# Patient Record
Sex: MALE | Race: BLACK OR AFRICAN AMERICAN | NOT HISPANIC OR LATINO | Employment: OTHER | RURAL
[De-identification: names, ages, dates, MRNs, and addresses within clinical notes are randomized per-mention and may not be internally consistent; named-entity substitution may affect disease eponyms.]

---

## 2021-06-03 ENCOUNTER — HOSPITAL ENCOUNTER (EMERGENCY)
Facility: HOSPITAL | Age: 81
Discharge: HOME | End: 2021-06-03
Attending: STUDENT IN AN ORGANIZED HEALTH CARE EDUCATION/TRAINING PROGRAM
Payer: MEDICARE

## 2021-06-03 VITALS
HEART RATE: 86 BPM | OXYGEN SATURATION: 99 % | DIASTOLIC BLOOD PRESSURE: 90 MMHG | HEIGHT: 70 IN | SYSTOLIC BLOOD PRESSURE: 156 MMHG | TEMPERATURE: 99 F | BODY MASS INDEX: 28.2 KG/M2 | WEIGHT: 197 LBS | RESPIRATION RATE: 16 BRPM

## 2021-06-03 DIAGNOSIS — M25.511 RIGHT SHOULDER PAIN: ICD-10-CM

## 2021-06-03 PROCEDURE — 63600175 PHARM REV CODE 636 W HCPCS: Performed by: STUDENT IN AN ORGANIZED HEALTH CARE EDUCATION/TRAINING PROGRAM

## 2021-06-03 PROCEDURE — 99283 PR EMERGENCY DEPT VISIT,LEVEL III: ICD-10-PCS | Mod: ,,, | Performed by: STUDENT IN AN ORGANIZED HEALTH CARE EDUCATION/TRAINING PROGRAM

## 2021-06-03 PROCEDURE — 99284 EMERGENCY DEPT VISIT MOD MDM: CPT | Mod: 25

## 2021-06-03 PROCEDURE — 96372 THER/PROPH/DIAG INJ SC/IM: CPT

## 2021-06-03 PROCEDURE — 99283 EMERGENCY DEPT VISIT LOW MDM: CPT | Mod: ,,, | Performed by: STUDENT IN AN ORGANIZED HEALTH CARE EDUCATION/TRAINING PROGRAM

## 2021-06-03 RX ORDER — SIMVASTATIN 80 MG/1
TABLET, FILM COATED ORAL
COMMUNITY
Start: 2020-06-10

## 2021-06-03 RX ORDER — KETOROLAC TROMETHAMINE 30 MG/ML
30 INJECTION, SOLUTION INTRAMUSCULAR; INTRAVENOUS
Status: COMPLETED | OUTPATIENT
Start: 2021-06-03 | End: 2021-06-03

## 2021-06-03 RX ORDER — OMEPRAZOLE 20 MG/1
CAPSULE, DELAYED RELEASE ORAL
COMMUNITY
Start: 2020-12-08

## 2021-06-03 RX ORDER — NAPROXEN 500 MG/1
500 TABLET ORAL 2 TIMES DAILY PRN
Qty: 14 TABLET | Refills: 0 | Status: SHIPPED | OUTPATIENT
Start: 2021-06-03

## 2021-06-03 RX ORDER — LOSARTAN POTASSIUM 25 MG/1
TABLET ORAL
COMMUNITY
Start: 2021-03-22

## 2021-06-03 RX ADMIN — KETOROLAC TROMETHAMINE 30 MG: 30 INJECTION, SOLUTION INTRAMUSCULAR; INTRAVENOUS at 06:06

## 2021-09-04 ENCOUNTER — HOSPITAL ENCOUNTER (EMERGENCY)
Facility: HOSPITAL | Age: 81
Discharge: HOME | End: 2021-09-04
Payer: MEDICARE

## 2021-09-04 VITALS
TEMPERATURE: 98 F | OXYGEN SATURATION: 97 % | HEART RATE: 64 BPM | RESPIRATION RATE: 14 BRPM | DIASTOLIC BLOOD PRESSURE: 76 MMHG | WEIGHT: 200 LBS | SYSTOLIC BLOOD PRESSURE: 147 MMHG | BODY MASS INDEX: 28.63 KG/M2 | HEIGHT: 70 IN

## 2021-09-04 DIAGNOSIS — R07.9 CHEST PAIN: ICD-10-CM

## 2021-09-04 DIAGNOSIS — R42 DIZZINESS: Primary | ICD-10-CM

## 2021-09-04 LAB
ALBUMIN SERPL BCP-MCNC: 3.7 G/DL (ref 3.5–5)
ALBUMIN/GLOB SERPL: 0.9 {RATIO}
ALP SERPL-CCNC: 69 U/L (ref 45–115)
ALT SERPL W P-5'-P-CCNC: 31 U/L (ref 16–61)
ANION GAP SERPL CALCULATED.3IONS-SCNC: 12 MMOL/L (ref 7–16)
AST SERPL W P-5'-P-CCNC: 20 U/L (ref 15–37)
BASOPHILS # BLD AUTO: 0.02 K/UL (ref 0–0.2)
BASOPHILS NFR BLD AUTO: 0.5 % (ref 0–1)
BILIRUB SERPL-MCNC: 0.3 MG/DL (ref 0–1.2)
BILIRUB UR QL STRIP: NEGATIVE
BUN SERPL-MCNC: 17 MG/DL (ref 7–18)
BUN/CREAT SERPL: 15 (ref 6–20)
CALCIUM SERPL-MCNC: 9.3 MG/DL (ref 8.5–10.1)
CHLORIDE SERPL-SCNC: 101 MMOL/L (ref 98–107)
CLARITY UR: CLEAR
CO2 SERPL-SCNC: 31 MMOL/L (ref 21–32)
COLOR UR: YELLOW
CREAT SERPL-MCNC: 1.11 MG/DL (ref 0.7–1.3)
DIFFERENTIAL METHOD BLD: ABNORMAL
EOSINOPHIL # BLD AUTO: 0.13 K/UL (ref 0–0.5)
EOSINOPHIL NFR BLD AUTO: 3.5 % (ref 1–4)
ERYTHROCYTE [DISTWIDTH] IN BLOOD BY AUTOMATED COUNT: 12.4 % (ref 11.5–14.5)
FLUAV AG UPPER RESP QL IA.RAPID: NEGATIVE
FLUBV AG UPPER RESP QL IA.RAPID: NEGATIVE
GLOBULIN SER-MCNC: 4.3 G/DL (ref 2–4)
GLUCOSE SERPL-MCNC: 116 MG/DL (ref 74–106)
GLUCOSE UR STRIP-MCNC: NEGATIVE MG/DL
HCT VFR BLD AUTO: 38.4 % (ref 40–54)
HGB BLD-MCNC: 13.1 G/DL (ref 13.5–18)
IMM GRANULOCYTES # BLD AUTO: 0.01 K/UL (ref 0–0.04)
IMM GRANULOCYTES NFR BLD: 0.3 % (ref 0–0.4)
KETONES UR STRIP-SCNC: NEGATIVE MG/DL
LEUKOCYTE ESTERASE UR QL STRIP: NEGATIVE
LYMPHOCYTES # BLD AUTO: 1.61 K/UL (ref 1–4.8)
LYMPHOCYTES NFR BLD AUTO: 43.6 % (ref 27–41)
MCH RBC QN AUTO: 31.4 PG (ref 27–31)
MCHC RBC AUTO-ENTMCNC: 34.1 G/DL (ref 32–36)
MCV RBC AUTO: 92.1 FL (ref 80–96)
MONOCYTES # BLD AUTO: 0.27 K/UL (ref 0–0.8)
MONOCYTES NFR BLD AUTO: 7.3 % (ref 2–6)
MPC BLD CALC-MCNC: 9.2 FL (ref 9.4–12.4)
NEUTROPHILS # BLD AUTO: 1.65 K/UL (ref 1.8–7.7)
NEUTROPHILS NFR BLD AUTO: 44.8 % (ref 53–65)
NITRITE UR QL STRIP: NEGATIVE
NRBC # BLD AUTO: 0 X10E3/UL
NRBC, AUTO (.00): 0 %
PH UR STRIP: 6 PH UNITS
PLATELET # BLD AUTO: 174 K/UL (ref 150–400)
POTASSIUM SERPL-SCNC: 4.1 MMOL/L (ref 3.5–5.1)
PROT SERPL-MCNC: 8 G/DL (ref 6.4–8.2)
PROT UR QL STRIP: NEGATIVE
RBC # BLD AUTO: 4.17 M/UL (ref 4.6–6.2)
RBC # UR STRIP: NEGATIVE /UL
SARS-COV+SARS-COV-2 AG RESP QL IA.RAPID: NEGATIVE
SODIUM SERPL-SCNC: 140 MMOL/L (ref 136–145)
SP GR UR STRIP: 1.02
TROPONIN I SERPL-MCNC: <0.017 NG/ML
UROBILINOGEN UR STRIP-ACNC: 0.2 MG/DL
WBC # BLD AUTO: 3.69 K/UL (ref 4.5–11)

## 2021-09-04 PROCEDURE — 25000003 PHARM REV CODE 250: Performed by: NURSE PRACTITIONER

## 2021-09-04 PROCEDURE — 99284 PR EMERGENCY DEPT VISIT,LEVEL IV: ICD-10-PCS | Mod: ,,, | Performed by: NURSE PRACTITIONER

## 2021-09-04 PROCEDURE — 84484 ASSAY OF TROPONIN QUANT: CPT | Performed by: NURSE PRACTITIONER

## 2021-09-04 PROCEDURE — 93010 ELECTROCARDIOGRAM REPORT: CPT | Mod: ,,, | Performed by: HOSPITALIST

## 2021-09-04 PROCEDURE — 87428 SARSCOV & INF VIR A&B AG IA: CPT | Performed by: NURSE PRACTITIONER

## 2021-09-04 PROCEDURE — 81003 URINALYSIS AUTO W/O SCOPE: CPT | Performed by: NURSE PRACTITIONER

## 2021-09-04 PROCEDURE — 93010 EKG 12-LEAD: ICD-10-PCS | Mod: ,,, | Performed by: HOSPITALIST

## 2021-09-04 PROCEDURE — 99284 EMERGENCY DEPT VISIT MOD MDM: CPT | Mod: ,,, | Performed by: NURSE PRACTITIONER

## 2021-09-04 PROCEDURE — 93005 ELECTROCARDIOGRAM TRACING: CPT

## 2021-09-04 PROCEDURE — 80053 COMPREHEN METABOLIC PANEL: CPT | Performed by: NURSE PRACTITIONER

## 2021-09-04 PROCEDURE — 85025 COMPLETE CBC W/AUTO DIFF WBC: CPT | Performed by: NURSE PRACTITIONER

## 2021-09-04 PROCEDURE — 99284 EMERGENCY DEPT VISIT MOD MDM: CPT

## 2021-09-04 PROCEDURE — 36415 COLL VENOUS BLD VENIPUNCTURE: CPT | Performed by: NURSE PRACTITIONER

## 2021-09-04 RX ORDER — ASPIRIN 325 MG
325 TABLET ORAL
Status: COMPLETED | OUTPATIENT
Start: 2021-09-04 | End: 2021-09-04

## 2021-09-04 RX ORDER — AMLODIPINE BESYLATE 5 MG/1
5 TABLET ORAL
Status: COMPLETED | OUTPATIENT
Start: 2021-09-04 | End: 2021-09-04

## 2021-09-04 RX ADMIN — AMLODIPINE BESYLATE 5 MG: 5 TABLET ORAL at 11:09

## 2021-09-04 RX ADMIN — ASPIRIN 325 MG ORAL TABLET 325 MG: 325 PILL ORAL at 10:09

## 2024-09-25 ENCOUNTER — HOSPITAL ENCOUNTER (INPATIENT)
Facility: HOSPITAL | Age: 84
LOS: 5 days | Discharge: HOME | DRG: 378 | End: 2024-09-30
Attending: EMERGENCY MEDICINE | Admitting: FAMILY MEDICINE
Payer: MEDICARE

## 2024-09-25 DIAGNOSIS — K92.2 GASTROINTESTINAL HEMORRHAGE, UNSPECIFIED GASTROINTESTINAL HEMORRHAGE TYPE: Primary | ICD-10-CM

## 2024-09-25 DIAGNOSIS — I48.91 A-FIB: ICD-10-CM

## 2024-09-25 DIAGNOSIS — K57.30 COLON, DIVERTICULOSIS: ICD-10-CM

## 2024-09-25 DIAGNOSIS — K92.2 GASTROINTESTINAL HEMORRHAGE: ICD-10-CM

## 2024-09-25 DIAGNOSIS — K92.2 GI BLEEDING: ICD-10-CM

## 2024-09-25 DIAGNOSIS — D62 ACUTE BLOOD LOSS ANEMIA: ICD-10-CM

## 2024-09-25 PROBLEM — F41.9 ANXIETY: Status: ACTIVE | Noted: 2024-09-25

## 2024-09-25 PROBLEM — K21.9 GERD (GASTROESOPHAGEAL REFLUX DISEASE): Status: ACTIVE | Noted: 2024-09-25

## 2024-09-25 PROBLEM — I10 PRIMARY HYPERTENSION: Status: ACTIVE | Noted: 2024-09-25

## 2024-09-25 LAB
ABORH RETYPE: NORMAL
ALBUMIN SERPL BCP-MCNC: 3.5 G/DL (ref 3.5–5)
ALBUMIN/GLOB SERPL: 1.1 {RATIO}
ALP SERPL-CCNC: 59 U/L (ref 45–115)
ALT SERPL W P-5'-P-CCNC: 19 U/L (ref 16–61)
ANION GAP SERPL CALCULATED.3IONS-SCNC: 9 MMOL/L (ref 7–16)
APTT PPP: 29.2 SECONDS (ref 25.2–37.3)
AST SERPL W P-5'-P-CCNC: 19 U/L (ref 15–37)
BASOPHILS # BLD AUTO: 0.02 K/UL (ref 0–0.2)
BASOPHILS NFR BLD AUTO: 0.6 % (ref 0–1)
BILIRUB SERPL-MCNC: 0.2 MG/DL (ref ?–1.2)
BUN SERPL-MCNC: 19 MG/DL (ref 7–18)
BUN/CREAT SERPL: 17 (ref 6–20)
CALCIUM SERPL-MCNC: 9.4 MG/DL (ref 8.5–10.1)
CHLORIDE SERPL-SCNC: 104 MMOL/L (ref 98–107)
CO2 SERPL-SCNC: 29 MMOL/L (ref 21–32)
CREAT SERPL-MCNC: 1.13 MG/DL (ref 0.7–1.3)
DIFFERENTIAL METHOD BLD: ABNORMAL
EGFR (NO RACE VARIABLE) (RUSH/TITUS): 64 ML/MIN/1.73M2
EOSINOPHIL # BLD AUTO: 0.13 K/UL (ref 0–0.5)
EOSINOPHIL NFR BLD AUTO: 3.7 % (ref 1–4)
ERYTHROCYTE [DISTWIDTH] IN BLOOD BY AUTOMATED COUNT: 12.6 % (ref 11.5–14.5)
GLOBULIN SER-MCNC: 3.3 G/DL (ref 2–4)
GLUCOSE SERPL-MCNC: 110 MG/DL (ref 74–106)
HCT VFR BLD AUTO: 33.3 % (ref 40–54)
HCT VFR BLD AUTO: 33.8 % (ref 40–54)
HEMOCCULT STL QL: POSITIVE
HGB BLD-MCNC: 11.1 G/DL (ref 13.5–18)
HGB BLD-MCNC: 11.1 G/DL (ref 13.5–18)
IMM GRANULOCYTES # BLD AUTO: 0.01 K/UL (ref 0–0.04)
IMM GRANULOCYTES NFR BLD: 0.3 % (ref 0–0.4)
INDIRECT COOMBS: NORMAL
INR BLD: 1.26
LYMPHOCYTES # BLD AUTO: 1.04 K/UL (ref 1–4.8)
LYMPHOCYTES NFR BLD AUTO: 29.9 % (ref 27–41)
MCH RBC QN AUTO: 31.5 PG (ref 27–31)
MCHC RBC AUTO-ENTMCNC: 32.8 G/DL (ref 32–36)
MCV RBC AUTO: 96 FL (ref 80–96)
MONOCYTES # BLD AUTO: 0.3 K/UL (ref 0–0.8)
MONOCYTES NFR BLD AUTO: 8.6 % (ref 2–6)
MPC BLD CALC-MCNC: 9.5 FL (ref 9.4–12.4)
NEUTROPHILS # BLD AUTO: 1.98 K/UL (ref 1.8–7.7)
NEUTROPHILS NFR BLD AUTO: 56.9 % (ref 53–65)
NRBC # BLD AUTO: 0 X10E3/UL
NRBC, AUTO (.00): 0 %
PLATELET # BLD AUTO: 173 K/UL (ref 150–400)
POTASSIUM SERPL-SCNC: 4.6 MMOL/L (ref 3.5–5.1)
PROT SERPL-MCNC: 6.8 G/DL (ref 6.4–8.2)
PROTHROMBIN TIME: 15.7 SECONDS (ref 11.7–14.7)
RBC # BLD AUTO: 3.52 M/UL (ref 4.6–6.2)
RH BLD: NORMAL
SARS-COV-2 RDRP RESP QL NAA+PROBE: NEGATIVE
SODIUM SERPL-SCNC: 137 MMOL/L (ref 136–145)
SPECIMEN OUTDATE: NORMAL
WBC # BLD AUTO: 3.48 K/UL (ref 4.5–11)

## 2024-09-25 PROCEDURE — 82272 OCCULT BLD FECES 1-3 TESTS: CPT

## 2024-09-25 PROCEDURE — 85730 THROMBOPLASTIN TIME PARTIAL: CPT | Performed by: EMERGENCY MEDICINE

## 2024-09-25 PROCEDURE — 11000001 HC ACUTE MED/SURG PRIVATE ROOM

## 2024-09-25 PROCEDURE — 36415 COLL VENOUS BLD VENIPUNCTURE: CPT | Performed by: GENERAL PRACTICE

## 2024-09-25 PROCEDURE — 25000003 PHARM REV CODE 250: Performed by: GENERAL PRACTICE

## 2024-09-25 PROCEDURE — 85018 HEMOGLOBIN: CPT | Performed by: GENERAL PRACTICE

## 2024-09-25 PROCEDURE — 86850 RBC ANTIBODY SCREEN: CPT | Performed by: GENERAL PRACTICE

## 2024-09-25 PROCEDURE — 99285 EMERGENCY DEPT VISIT HI MDM: CPT | Mod: 25

## 2024-09-25 PROCEDURE — 85014 HEMATOCRIT: CPT | Performed by: GENERAL PRACTICE

## 2024-09-25 PROCEDURE — 87635 SARS-COV-2 COVID-19 AMP PRB: CPT | Performed by: EMERGENCY MEDICINE

## 2024-09-25 PROCEDURE — 80053 COMPREHEN METABOLIC PANEL: CPT | Performed by: EMERGENCY MEDICINE

## 2024-09-25 PROCEDURE — 93005 ELECTROCARDIOGRAM TRACING: CPT

## 2024-09-25 PROCEDURE — 86900 BLOOD TYPING SEROLOGIC ABO: CPT | Performed by: GENERAL PRACTICE

## 2024-09-25 PROCEDURE — 63600175 PHARM REV CODE 636 W HCPCS: Performed by: GENERAL PRACTICE

## 2024-09-25 PROCEDURE — 36415 COLL VENOUS BLD VENIPUNCTURE: CPT | Performed by: EMERGENCY MEDICINE

## 2024-09-25 PROCEDURE — 85610 PROTHROMBIN TIME: CPT | Performed by: EMERGENCY MEDICINE

## 2024-09-25 PROCEDURE — 93010 ELECTROCARDIOGRAM REPORT: CPT | Mod: ,,, | Performed by: INTERNAL MEDICINE

## 2024-09-25 PROCEDURE — 85025 COMPLETE CBC W/AUTO DIFF WBC: CPT | Performed by: EMERGENCY MEDICINE

## 2024-09-25 PROCEDURE — 99223 1ST HOSP IP/OBS HIGH 75: CPT | Mod: GC,,, | Performed by: FAMILY MEDICINE

## 2024-09-25 RX ORDER — ATORVASTATIN CALCIUM 40 MG/1
40 TABLET, FILM COATED ORAL DAILY
Status: DISCONTINUED | OUTPATIENT
Start: 2024-09-25 | End: 2024-09-25

## 2024-09-25 RX ORDER — SERTRALINE HYDROCHLORIDE 50 MG/1
50 TABLET, FILM COATED ORAL DAILY
Status: DISCONTINUED | OUTPATIENT
Start: 2024-09-26 | End: 2024-09-30 | Stop reason: HOSPADM

## 2024-09-25 RX ORDER — SERTRALINE HYDROCHLORIDE 50 MG/1
100 TABLET, FILM COATED ORAL DAILY
COMMUNITY

## 2024-09-25 RX ORDER — SODIUM CHLORIDE 9 MG/ML
INJECTION, SOLUTION INTRAVENOUS CONTINUOUS
Status: DISCONTINUED | OUTPATIENT
Start: 2024-09-25 | End: 2024-09-30 | Stop reason: HOSPADM

## 2024-09-25 RX ORDER — METOPROLOL TARTRATE 25 MG/1
12.5 TABLET ORAL 2 TIMES DAILY
Status: DISCONTINUED | OUTPATIENT
Start: 2024-09-25 | End: 2024-09-30 | Stop reason: HOSPADM

## 2024-09-25 RX ORDER — ATORVASTATIN CALCIUM 40 MG/1
40 TABLET, FILM COATED ORAL NIGHTLY
Status: DISCONTINUED | OUTPATIENT
Start: 2024-09-25 | End: 2024-09-25

## 2024-09-25 RX ORDER — SIMVASTATIN 40 MG/1
10 TABLET, FILM COATED ORAL NIGHTLY
COMMUNITY
Start: 2023-12-29

## 2024-09-25 RX ORDER — LOSARTAN POTASSIUM 50 MG/1
50 TABLET ORAL DAILY
Status: DISCONTINUED | OUTPATIENT
Start: 2024-09-26 | End: 2024-09-30 | Stop reason: HOSPADM

## 2024-09-25 RX ORDER — METOPROLOL TARTRATE 25 MG/1
12.5 TABLET, FILM COATED ORAL 2 TIMES DAILY
COMMUNITY
Start: 2024-08-01 | End: 2024-09-26

## 2024-09-25 RX ORDER — LOSARTAN POTASSIUM 25 MG/1
25 TABLET ORAL DAILY
Status: DISCONTINUED | OUTPATIENT
Start: 2024-09-26 | End: 2024-09-25

## 2024-09-25 RX ORDER — ATORVASTATIN CALCIUM 20 MG/1
20 TABLET, FILM COATED ORAL DAILY
Status: DISCONTINUED | OUTPATIENT
Start: 2024-09-26 | End: 2024-09-28

## 2024-09-25 RX ORDER — PANTOPRAZOLE SODIUM 40 MG/10ML
40 INJECTION, POWDER, LYOPHILIZED, FOR SOLUTION INTRAVENOUS 2 TIMES DAILY
Status: DISCONTINUED | OUTPATIENT
Start: 2024-09-25 | End: 2024-09-30 | Stop reason: HOSPADM

## 2024-09-25 RX ORDER — LOSARTAN POTASSIUM 50 MG/1
50 TABLET ORAL DAILY
COMMUNITY
Start: 2024-08-01

## 2024-09-25 RX ADMIN — SODIUM CHLORIDE: 900 INJECTION, SOLUTION INTRAVENOUS at 02:09

## 2024-09-25 RX ADMIN — PANTOPRAZOLE SODIUM 40 MG: 40 INJECTION, POWDER, LYOPHILIZED, FOR SOLUTION INTRAVENOUS at 02:09

## 2024-09-25 RX ADMIN — METOPROLOL TARTRATE 12.5 MG: 25 TABLET, FILM COATED ORAL at 09:09

## 2024-09-25 RX ADMIN — PANTOPRAZOLE SODIUM 40 MG: 40 INJECTION, POWDER, LYOPHILIZED, FOR SOLUTION INTRAVENOUS at 09:09

## 2024-09-25 NOTE — HPI
Mr Alberto is an 84 yo male who is admitted to the hospital with onset of rectal bleeding. Patient is a good historian therefore information is obtained from patient interview as well as chart review. Patient has a prior history of hypertension and atrial fibrillation on Eliquis. Patient was in his usual state of health until yesterday morning when he woke up. He states he felt good and went to have a bowel movement but as he flushed he thought he saw some blood in the stool. He states he went on about his day and felt well. He got up this morning and per his routine had another BM but this time was definetly noted to have some dark red blood mixed in stool in a moderate amount. He had no associated abdominal pain, nausea or vomiting. States his weight has been stable. States he has no increased reflux or other GI symptoms at this time. He became concerned however due to the bleeding and presented to the ER for evaluation. His last dose of Eliquis was last night. On admission, labs were obtained and HH is stable at 11/33. BCR is at 17. 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 He cannot recall his last colonoscopy but states he was told he shouldn't have to repeat it last time it was done with only diverticulosis. He indicates he has had a prior diverticular bleed but can't give specifics at this time.     9/26/24-patient is seen resting in bed awake and alert. States he is feeling okay however he has had 3 episodes of rectal bleeding since yesterday but describes it to be more maroon in color. Denies any abdominal pain. HH is stable at 10/31. He is tolerating a clear liquid diet at this time.     09/27/2024-Patient is seen, resting in bed, awake and alert.  Patient family is at bedside.  Patient states that he has had a proximally 2-3 dark bowel movements since yesterday.  He states they are less in amount and  definitely darker than initial presentation.  His labs were reviewed his H&H is 9/29.  Today will be day 3 of his Eliquis being held.  His abdomen is soft, nontender.  He does state that he is hungry.

## 2024-09-25 NOTE — ED TRIAGE NOTES
Patient arrives to ED with complaint of dark red rectal bleeding that started yesterday. Patient states a hx of a GI bleed years ago that required blood transfusion due to anemia.

## 2024-09-25 NOTE — SUBJECTIVE & OBJECTIVE
Past Medical History:   Diagnosis Date    Hypertension        History reviewed. No pertinent surgical history.    Review of patient's allergies indicates:  No Known Allergies    No current facility-administered medications on file prior to encounter.     Current Outpatient Medications on File Prior to Encounter   Medication Sig    apixaban (ELIQUIS) 5 mg Tab Take 5 mg by mouth.    losartan (COZAAR) 50 MG tablet Take 50 mg by mouth once daily.  TAKE ONE TABLET BY MOUTH DAILY FOR BLOOD PRESSURE STOP HCTZ FOR BLOOD PRESSURE    metoprolol tartrate (LOPRESSOR) 25 MG tablet Take 12.5 mg by mouth 2 (two) times daily.  TAKE ONE-HALF TABLET BY MOUTH 2 TIMES A DAY    simvastatin (ZOCOR) 40 MG tablet Take 20 mg by mouth every evening.  TAKE ONE-HALF TABLET BY MOUTH EVERY EVENING FOR CHOLESTEROL    [DISCONTINUED] losartan (COZAAR) 25 MG tablet TAKE ONE TABLET BY MOUTH DAILY FOR BLOOD PRESSURE REPLACES LISINOPRIL    [DISCONTINUED] naproxen (NAPROSYN) 500 MG tablet Take 1 tablet (500 mg total) by mouth 2 (two) times daily as needed (Pain).    [DISCONTINUED] omeprazole (PRILOSEC) 20 MG capsule TAKE 1 CAPSULE BY MOUTH DAILY FOR STOMACH. TAKE 30 TO 60 MINUTES BEFORE A MEAL.    [DISCONTINUED] simvastatin (ZOCOR) 80 MG tablet TAKE ONE-HALF TABLET BY MOUTH EVERY EVENING FOR CHOLESTEROL     Family History    None       Tobacco Use    Smoking status: Never    Smokeless tobacco: Never   Substance and Sexual Activity    Alcohol use: Never    Drug use: Never    Sexual activity: Not on file     Review of Systems   Constitutional:  Negative for chills and fever.   HENT:  Negative for congestion and rhinorrhea.    Eyes:  Negative for visual disturbance.   Respiratory:  Negative for cough and shortness of breath.    Cardiovascular:  Negative for chest pain.   Gastrointestinal:  Positive for anal bleeding. Negative for abdominal distention, abdominal pain, diarrhea, nausea, rectal pain and vomiting.   Genitourinary:  Negative for dysuria and  hematuria.   Musculoskeletal:  Negative for arthralgias.   Skin:  Negative for wound.   Neurological:  Negative for syncope and headaches.   Psychiatric/Behavioral:  Negative for agitation. The patient is not nervous/anxious.      Objective:     Vital Signs (Most Recent):  Temp: 98 °F (36.7 °C) (09/25/24 0929)  Pulse: 78 (09/25/24 0929)  Resp: 18 (09/25/24 0929)  BP: (!) 167/78 (09/25/24 0929)  SpO2: 97 % (09/25/24 0929) Vital Signs (24h Range):  Temp:  [98 °F (36.7 °C)] 98 °F (36.7 °C)  Pulse:  [78] 78  Resp:  [18] 18  SpO2:  [97 %] 97 %  BP: (167)/(78) 167/78     Weight: 84.8 kg (187 lb)  Body mass index is 28.43 kg/m².     Physical Exam  Vitals and nursing note reviewed.   Constitutional:       General: He is not in acute distress.     Appearance: Normal appearance. He is normal weight. He is not ill-appearing, toxic-appearing or diaphoretic.   HENT:      Head: Normocephalic and atraumatic.      Right Ear: External ear normal.      Left Ear: External ear normal.      Nose: Nose normal. No congestion or rhinorrhea.      Mouth/Throat:      Mouth: Mucous membranes are moist.      Pharynx: Oropharynx is clear. No oropharyngeal exudate or posterior oropharyngeal erythema.   Eyes:      General: No scleral icterus.     Extraocular Movements: Extraocular movements intact.      Conjunctiva/sclera: Conjunctivae normal.      Pupils: Pupils are equal, round, and reactive to light.   Cardiovascular:      Rate and Rhythm: Normal rate and regular rhythm.      Pulses: Normal pulses.      Heart sounds: Normal heart sounds. No murmur heard.  Pulmonary:      Effort: Pulmonary effort is normal. No respiratory distress.      Breath sounds: No wheezing, rhonchi or rales.   Abdominal:      General: Abdomen is flat. Bowel sounds are normal. There is no distension.      Palpations: Abdomen is soft.      Tenderness: There is no abdominal tenderness. There is no right CVA tenderness, left CVA tenderness, guarding or rebound.    Musculoskeletal:         General: No swelling. Normal range of motion.      Cervical back: Neck supple. No rigidity or tenderness.      Right lower leg: No edema.      Left lower leg: No edema.   Skin:     General: Skin is warm and dry.      Capillary Refill: Capillary refill takes less than 2 seconds.      Coloration: Skin is not jaundiced.      Findings: No lesion or rash.   Neurological:      General: No focal deficit present.      Mental Status: He is alert and oriented to person, place, and time.      Cranial Nerves: No cranial nerve deficit.      Sensory: No sensory deficit.      Motor: No weakness.   Psychiatric:         Mood and Affect: Mood normal.         Behavior: Behavior normal.         Thought Content: Thought content normal.              CRANIAL NERVES     CN III, IV, VI   Pupils are equal, round, and reactive to light.       Significant Labs: All pertinent labs within the past 24 hours have been reviewed.    Significant Imaging: I have reviewed all pertinent imaging results/findings within the past 24 hours.

## 2024-09-25 NOTE — ED PROVIDER NOTES
Encounter Date: 9/25/2024       History     Chief Complaint   Patient presents with    Rectal Bleeding     84 y/o male passing blood per rectum.  Onset was yesterday.  He has been passing dark blood he says.  He reports that he had to have a transfusion in the past for GI bleeding.        Review of patient's allergies indicates:  No Known Allergies  Past Medical History:   Diagnosis Date    Hypertension      Past Surgical History:   Procedure Laterality Date    PROSTATE SURGERY       No family history on file.  Social History     Tobacco Use    Smoking status: Never    Smokeless tobacco: Never   Substance Use Topics    Alcohol use: Never    Drug use: Never     Review of Systems   All other systems reviewed and are negative.      Physical Exam     Initial Vitals [09/25/24 0929]   BP Pulse Resp Temp SpO2   (!) 167/78 78 18 98 °F (36.7 °C) 97 %      MAP       --         Physical Exam    Nursing note and vitals reviewed.  Constitutional: He appears well-developed and well-nourished.   HENT:   Head: Normocephalic and atraumatic.   Nose: Nose normal. Mouth/Throat: Oropharynx is clear and moist.   Eyes: Conjunctivae and EOM are normal. Pupils are equal, round, and reactive to light.   Neck: Neck supple.   Normal range of motion.  Cardiovascular:  Normal rate, regular rhythm and normal heart sounds.           Pulmonary/Chest: Breath sounds normal.   Abdominal: Abdomen is soft. Bowel sounds are normal.   Musculoskeletal:         General: Normal range of motion.      Cervical back: Normal range of motion and neck supple.     Neurological: He is alert and oriented to person, place, and time. He has normal strength. GCS score is 15. GCS eye subscore is 4. GCS verbal subscore is 5. GCS motor subscore is 6.         Medical Screening Exam   See Full Note    ED Course   Procedures  Labs Reviewed   COMPREHENSIVE METABOLIC PANEL - Abnormal       Result Value    Sodium 137      Potassium 4.6      Chloride 104      CO2 29      Anion  Gap 9      Glucose 110 (*)     BUN 19 (*)     Creatinine 1.13      BUN/Creatinine Ratio 17      Calcium 9.4      Total Protein 6.8      Albumin 3.5      Globulin 3.3      A/G Ratio 1.1      Bilirubin, Total 0.2      Alk Phos 59      ALT 19      AST 19      eGFR 64     PROTIME-INR - Abnormal    PT 15.7 (*)     INR 1.26     CBC WITH DIFFERENTIAL - Abnormal    WBC 3.48 (*)     RBC 3.52 (*)     Hemoglobin 11.1 (*)     Hematocrit 33.8 (*)     MCV 96.0      MCH 31.5 (*)     MCHC 32.8      RDW 12.6      Platelet Count 173      MPV 9.5      Neutrophils % 56.9      Lymphocytes % 29.9      Monocytes % 8.6 (*)     Eosinophils % 3.7      Basophils % 0.6      Immature Granulocytes % 0.3      nRBC, Auto 0.0      Neutrophils, Abs 1.98      Lymphocytes, Absolute 1.04      Monocytes, Absolute 0.30      Eosinophils, Absolute 0.13      Basophils, Absolute 0.02      Immature Granulocytes, Absolute 0.01      nRBC, Absolute 0.00      Diff Type Auto     OCCULT BLOOD X 1, STOOL - Abnormal    Occult Blood Positive (*)    HEMOGLOBIN AND HEMATOCRIT, BLOOD - Abnormal    Hemoglobin 11.1 (*)     Hematocrit 33.3 (*)    HEMOGLOBIN AND HEMATOCRIT, BLOOD - Abnormal    Hemoglobin 9.7 (*)     Hematocrit 29.0 (*)    BASIC METABOLIC PANEL - Abnormal    Sodium 140      Potassium 3.9      Chloride 108 (*)     CO2 26      Anion Gap 10      Glucose 99      BUN 16      Creatinine 0.82      BUN/Creatinine Ratio 20      Calcium 8.4 (*)     eGFR 86     CBC WITH DIFFERENTIAL - Abnormal    WBC 3.64 (*)     RBC 3.12 (*)     Hemoglobin 9.8 (*)     Hematocrit 29.4 (*)     MCV 94.2      MCH 31.4 (*)     MCHC 33.3      RDW 12.6      Platelet Count 159      MPV 9.5      Neutrophils % 52.8 (*)     Lymphocytes % 35.4      Monocytes % 7.4 (*)     Eosinophils % 3.3      Basophils % 0.8      Immature Granulocytes % 0.3      nRBC, Auto 0.0      Neutrophils, Abs 1.92      Lymphocytes, Absolute 1.29      Monocytes, Absolute 0.27      Eosinophils, Absolute 0.12      Basophils,  Absolute 0.03      Immature Granulocytes, Absolute 0.01      nRBC, Absolute 0.00      Diff Type Auto     HEMOGLOBIN AND HEMATOCRIT, BLOOD - Abnormal    Hemoglobin 10.3 (*)     Hematocrit 31.0 (*)    HEMOGLOBIN AND HEMATOCRIT, BLOOD - Abnormal    Hemoglobin 8.9 (*)     Hematocrit 26.8 (*)    BASIC METABOLIC PANEL - Abnormal    Sodium 138      Potassium 3.7      Chloride 108 (*)     CO2 28      Anion Gap 6 (*)     Glucose 97      BUN 10      Creatinine 0.93      BUN/Creatinine Ratio 11      Calcium 8.1 (*)     eGFR 80     CBC WITH DIFFERENTIAL - Abnormal    WBC 3.79 (*)     RBC 2.81 (*)     Hemoglobin 9.0 (*)     Hematocrit 27.0 (*)     MCV 96.1 (*)     MCH 32.0 (*)     MCHC 33.3      RDW 13.0      Platelet Count 157      MPV 9.5      Neutrophils % 51.0 (*)     Lymphocytes % 35.9      Monocytes % 8.4 (*)     Eosinophils % 4.2 (*)     Basophils % 0.5      Immature Granulocytes % 0.0      nRBC, Auto 0.0      Neutrophils, Abs 1.93      Lymphocytes, Absolute 1.36      Monocytes, Absolute 0.32      Eosinophils, Absolute 0.16      Basophils, Absolute 0.02      Immature Granulocytes, Absolute 0.00      nRBC, Absolute 0.00      Diff Type Auto     HEMOGLOBIN AND HEMATOCRIT, BLOOD - Abnormal    Hemoglobin 9.7 (*)     Hematocrit 29.6 (*)    POCT OCCULT BLOOD (STOOL) - Abnormal    Fecal Occult Blood Positive (*)    APTT - Normal    PTT 29.2     SARS-COV-2 RNA AMPLIFICATION, QUAL - Normal    SARS COV-2 Molecular Negative      Narrative:     Negative SARS-CoV results should not be used as the sole basis for treatment or patient management decisions; negative results should be considered in the context of a patient's recent exposures, history and the presene of clinical signs and symptoms consistent with COVID-19.  Negative results should be treated as presumptive and confirmed by molecular assay, if necessary for patient management.   CBC W/ AUTO DIFFERENTIAL    Narrative:     The following orders were created for panel order CBC  auto differential.  Procedure                               Abnormality         Status                     ---------                               -----------         ------                     CBC with Differential[[id number]]       Abnormal            Final result                 Please view results for these tests on the individual orders.   CBC W/ AUTO DIFFERENTIAL    Narrative:     The following orders were created for panel order CBC auto differential.  Procedure                               Abnormality         Status                     ---------                               -----------         ------                     CBC with Differential[[id number]]       Abnormal            Final result                 Please view results for these tests on the individual orders.   HEMOGLOBIN A1C    Hemoglobin A1C 5.8      Estimated Average Glucose 120     CBC W/ AUTO DIFFERENTIAL    Narrative:     The following orders were created for panel order CBC auto differential.  Procedure                               Abnormality         Status                     ---------                               -----------         ------                     CBC with Differential[[id number]]       Abnormal            Final result                 Please view results for these tests on the individual orders.   TYPE & SCREEN    Specimen Outdate 09/28/2024 23:59      Group & Rh O POS      Indirect Michelle NEG     ABORH RETYPE    ABORH Retype O POS          ECG Results              EKG 12-lead (Final result)        Collection Time Result Time QRS Duration OHS QTC Calculation    09/25/24 19:20:13 10/01/24 02:54:53 82 433                     Final result by Interface, Lab In [facility] (10/01/24 02:54:58)                   Narrative:    Test Reason : I48.91,    Vent. Rate : 072 BPM     Atrial Rate : 072 BPM     P-R Int : 180 ms          QRS Dur : 082 ms      QT Int : 396 ms       P-R-T Axes : 052 009 074 degrees     QTc Int : 433 ms    Sinus  rhythm with Premature atrial complexes  Cannot rule out Inferior infarct ,age undetermined  Abnormal ECG  Confirmed by Rosendo Quesada MD ([id number]) on 10/1/2024 2:54:49 AM    Referred By: [email or web address]   SELF           Confirmed By:Rosendo Quesada MD                                  Imaging Results    None          Medications   polyethylene glycol (GoLYTELY) solution (4,000 mLs Oral Given 9/29/24 0466)     Medical Decision Making  BLOOD PER RECTUM...    DDX:  UPPER VS LOWER GI BLEEDING VS OTHER    ADMIT    Amount and/or Complexity of Data Reviewed  Labs: ordered. Decision-making details documented in ED Course.  ECG/medicine tests: ordered. Decision-making details documented in ED Course.  Discussion of management or test interpretation with external provider(s): DISCUSSED WITH ADMITTING SERVICE.      Risk  Decision regarding hospitalization.                                      Clinical Impression:   Final diagnoses:  [K92.2] Gastrointestinal hemorrhage, unspecified gastrointestinal hemorrhage type (Primary)  [K92.2] GI bleeding        ED Disposition Condition    Admit                 Sergio Kelly MD  10/04/24 9085

## 2024-09-25 NOTE — ASSESSMENT & PLAN NOTE
9/25/24-Admitted with two episodes of rectal bleed with this morning being the last. Labs reviewed. HH stable. Note prior history as above. Last dose of Eliquis today. Would recommend holding Eliquis. Monitor HH. PPI. May begin clear liquids. Will review case with Dr Junior with plan and addendum to follow.    Pressure changed via data card . Ipap max 22, epap min 15 per Denise z

## 2024-09-25 NOTE — ASSESSMENT & PLAN NOTE
Patient with Long standing persistent (>12 months) atrial fibrillation which is controlled currently with Beta Blocker. Patient is currently in sinus rhythm.[id number] Score: 2. HASBLED Score: 2. Holding home Eliquis due to acute GI bleeding.

## 2024-09-25 NOTE — ASSESSMENT & PLAN NOTE
Patient has acute blood loss due to hemorrhage, the hemorrhage is due to gastrointestinal bleed, patient does have a propensity for bleeding due to a medication, the medication is Eliquis. Will trend hemoglobin/hematocrit Every 8 hours, as well as monitor and correct for any coagulation defects. CBC and vital signs have been reviewed and last CBC was noted-   Lab Results   Component Value Date    WBC 3.48 (L) 09/25/2024    HGB 11.1 (L) 09/25/2024    HCT 33.3 (L) 09/25/2024    MCV 96.0 09/25/2024     09/25/2024       Hold home Eliquis.  IV Protonix 40mg BID.  Will order a type and screen and consent patient for blood transfusion. Will transfuse if Hgb is <7g/dl (<8g/dl in cases of active ACS) or if patient has rapid bleeding leading to hemodynamic instability.  GI consult, recommendations appreciated.    9/26  Per GI: Continue holding Eliquis; Continue with clear liquid diet; Consider colonoscopy once Eliquis held x 3 days   -Monitor H/H daily

## 2024-09-25 NOTE — H&P
[facility] - Emergency Department  [facility] Medicine  History & Physical    Patient Name: Roberth Alberto  MRN: [id number]  Patient Class: IP- Inpatient  Admission Date: 9/25/2024  Attending Physician: Michael Peñaloza Jr., MD   Primary Care Provider: Yessy Pruitt MD (Inactive)         Patient information was obtained from patient, past medical records, and ER records.     Subjective:     Principal Problem:Acute GI bleeding    Chief Complaint:   Chief Complaint   Patient presents with    Rectal Bleeding        HPI: Patient is a 82 y/o male with PMHx of Afib on Eliquis, HTN, anxiety and GERD s/p esophageal dilation who presents to the ED with complaint of blood per rectum. Patient reports having dark blood per rectum with bowel movements since yesterday morning. He reports having GI bleed x3 in the past that required blood transfusion, last GI bleed was last year. He had esophageal dilation one month ago in [place] where he spends time with his significant other. Patient follows with cardiology, Dr. Kam, at [facility]. Patient denies smoking or heavy alcohol use. He lives alone. Patient denies fever, chills, nausea, vomiting, SOB, chest pain or urinary habit changes.    In the ED vital signs showed /78, HR 78, RR 18, SpO2 97% and afebrile. Blood work H/H 11/33, WBC 3.48, . No electrolyte abnormalities. BUN/Cr 19/1.13, glucose 110. FOBT positive. Patient will be admitted to the hospital for further management.    Past Medical History:   Diagnosis Date    Hypertension        History reviewed. No pertinent surgical history.    Review of patient's allergies indicates:  No Known Allergies    No current facility-administered medications on file prior to encounter.     Current Outpatient Medications on File Prior to Encounter   Medication Sig    apixaban (ELIQUIS) 5 mg Tab Take 5 mg by mouth.    losartan (COZAAR) 50 MG tablet Take 50 mg by mouth once daily.  TAKE ONE TABLET BY MOUTH DAILY FOR  BLOOD PRESSURE STOP HCTZ FOR BLOOD PRESSURE    metoprolol tartrate (LOPRESSOR) 25 MG tablet Take 12.5 mg by mouth 2 (two) times daily.  TAKE ONE-HALF TABLET BY MOUTH 2 TIMES A DAY    simvastatin (ZOCOR) 40 MG tablet Take 20 mg by mouth every evening.  TAKE ONE-HALF TABLET BY MOUTH EVERY EVENING FOR CHOLESTEROL    [DISCONTINUED] losartan (COZAAR) 25 MG tablet TAKE ONE TABLET BY MOUTH DAILY FOR BLOOD PRESSURE REPLACES LISINOPRIL    [DISCONTINUED] naproxen (NAPROSYN) 500 MG tablet Take 1 tablet (500 mg total) by mouth 2 (two) times daily as needed (Pain).    [DISCONTINUED] omeprazole (PRILOSEC) 20 MG capsule TAKE 1 CAPSULE BY MOUTH DAILY FOR STOMACH. TAKE 30 TO 60 MINUTES BEFORE A MEAL.    [DISCONTINUED] simvastatin (ZOCOR) 80 MG tablet TAKE ONE-HALF TABLET BY MOUTH EVERY EVENING FOR CHOLESTEROL     Family History    None       Tobacco Use    Smoking status: Never    Smokeless tobacco: Never   Substance and Sexual Activity    Alcohol use: Never    Drug use: Never    Sexual activity: Not on file     Review of Systems   Constitutional:  Negative for chills and fever.   HENT:  Negative for congestion and rhinorrhea.    Eyes:  Negative for visual disturbance.   Respiratory:  Negative for cough and shortness of breath.    Cardiovascular:  Negative for chest pain.   Gastrointestinal:  Positive for anal bleeding. Negative for abdominal distention, abdominal pain, diarrhea, nausea, rectal pain and vomiting.   Genitourinary:  Negative for dysuria and hematuria.   Musculoskeletal:  Negative for arthralgias.   Skin:  Negative for wound.   Neurological:  Negative for syncope and headaches.   Psychiatric/Behavioral:  Negative for agitation. The patient is not nervous/anxious.      Objective:     Vital Signs (Most Recent):  Temp: 98 °F (36.7 °C) (09/25/24 0929)  Pulse: 78 (09/25/24 0929)  Resp: 18 (09/25/24 0929)  BP: (!) 167/78 (09/25/24 0929)  SpO2: 97 % (09/25/24 0929) Vital Signs (24h Range):  Temp:  [98 °F (36.7 °C)] 98 °F  (36.7 °C)  Pulse:  [78] 78  Resp:  [18] 18  SpO2:  [97 %] 97 %  BP: (167)/(78) 167/78     Weight: 84.8 kg (187 lb)  Body mass index is 28.43 kg/m².     Physical Exam  Vitals and nursing note reviewed.   Constitutional:       General: He is not in acute distress.     Appearance: Normal appearance. He is normal weight. He is not ill-appearing, toxic-appearing or diaphoretic.   HENT:      Head: Normocephalic and atraumatic.      Right Ear: External ear normal.      Left Ear: External ear normal.      Nose: Nose normal. No congestion or rhinorrhea.      Mouth/Throat:      Mouth: Mucous membranes are moist.      Pharynx: Oropharynx is clear. No oropharyngeal exudate or posterior oropharyngeal erythema.   Eyes:      General: No scleral icterus.     Extraocular Movements: Extraocular movements intact.      Conjunctiva/sclera: Conjunctivae normal.      Pupils: Pupils are equal, round, and reactive to light.   Cardiovascular:      Rate and Rhythm: Normal rate and regular rhythm.      Pulses: Normal pulses.      Heart sounds: Normal heart sounds. No murmur heard.  Pulmonary:      Effort: Pulmonary effort is normal. No respiratory distress.      Breath sounds: No wheezing, rhonchi or rales.   Abdominal:      General: Abdomen is flat. Bowel sounds are normal. There is no distension.      Palpations: Abdomen is soft.      Tenderness: There is no abdominal tenderness. There is no right CVA tenderness, left CVA tenderness, guarding or rebound.   Musculoskeletal:         General: No swelling. Normal range of motion.      Cervical back: Neck supple. No rigidity or tenderness.      Right lower leg: No edema.      Left lower leg: No edema.   Skin:     General: Skin is warm and dry.      Capillary Refill: Capillary refill takes less than 2 seconds.      Coloration: Skin is not jaundiced.      Findings: No lesion or rash.   Neurological:      General: No focal deficit present.      Mental Status: He is alert and oriented to person,  place, and time.      Cranial Nerves: No cranial nerve deficit.      Sensory: No sensory deficit.      Motor: No weakness.   Psychiatric:         Mood and Affect: Mood normal.         Behavior: Behavior normal.         Thought Content: Thought content normal.              CRANIAL NERVES     CN III, IV, VI   Pupils are equal, round, and reactive to light.       Significant Labs: All pertinent labs within the past 24 hours have been reviewed.    Significant Imaging: I have reviewed all pertinent imaging results/findings within the past 24 hours.    Assessment/Plan:     * Acute GI bleeding  Patient has acute blood loss due to hemorrhage, the hemorrhage is due to gastrointestinal bleed, patient does have a propensity for bleeding due to a medication, the medication is Eliquis. Will trend hemoglobin/hematocrit Every 8 hours, as well as monitor and correct for any coagulation defects. CBC and vital signs have been reviewed and last CBC was noted-   Lab Results   Component Value Date    WBC 3.48 (L) 09/25/2024    HGB 11.1 (L) 09/25/2024    HCT 33.3 (L) 09/25/2024    MCV 96.0 09/25/2024     09/25/2024         Will order a type and screen and consent patient for blood transfusion. Will transfuse if Hgb is <7g/dl (<8g/dl in cases of active ACS) or if patient has rapid bleeding leading to hemodynamic instability.  GI consult, recommendations appreciated.    Atrial fibrillation  Patient with Long standing persistent (>12 months) atrial fibrillation which is controlled currently with Beta Blocker. Patient is currently in sinus rhythm.[id number] Score: 2. HASBLED Score: 2. Holding home Eliquis due to acute GI bleeding.    Anxiety  Continue home sertraline.      Primary hypertension  Chronic, controlled. Latest blood pressure and vitals reviewed-     Temp:  [98 °F (36.7 °C)]   Pulse:  [78]   Resp:  [18]   BP: (167)/(78)   SpO2:  [97 %] .   Home meds for hypertension were reviewed and noted below.   Hypertension Medications                losartan (COZAAR) 50 MG tablet Take 50 mg by mouth once daily.  TAKE ONE TABLET BY MOUTH DAILY FOR BLOOD PRESSURE STOP HCTZ FOR BLOOD PRESSURE    metoprolol tartrate (LOPRESSOR) 25 MG tablet Take 12.5 mg by mouth 2 (two) times daily.  TAKE ONE-HALF TABLET BY MOUTH 2 TIMES A DAY            While in the hospital, will manage blood pressure as follows; Continue home antihypertensive regimen    Will utilize p.r.n. blood pressure medication only if patient's blood pressure greater than 180/110 and he develops symptoms such as worsening chest pain or shortness of breath.      VTE Risk Mitigation (From admission, onward)           Ordered     Reason for No Pharmacological VTE Prophylaxis  Once        Question:  Reasons:  Answer:  Active Bleeding    09/25/24 1331     IP VTE HIGH RISK PATIENT  Once         09/25/24 1331     Place sequential compression device  Until discontinued         09/25/24 1331                                    Efra Muniz MD  Department of Hospital Medicine  [facility] - Emergency Department

## 2024-09-25 NOTE — HPI
Patient is a 82 y/o male with PMHx of Afib on Eliquis, HTN, anxiety, GERD s/p esophageal dilation who presents to the ED with complaint of blood per rectum. Patient reports having dark blood per rectum with bowel movements since yesterday morning. He reports having GI bleed x3 in the past that required blood transfusion, last GI bleed was last year. He had esophageal dilation one month ago in [place] where he spends time with his significant other. Patient follows with cardiology, Dr. Kam, at [facility]. Patient denies smoking or heavy alcohol use. He lives alone. Patient denies fever, chills, nausea, vomiting, SOB, chest pain or urinary habit changes.    In the ED vital signs showed /78, HR 78, RR 18, SpO2 97% and afebrile. Blood work H/H 11/33, WBC 3.48, . No electrolyte abnormalities. BUN/Cr 19/1.13, glucose 110. FOBT positive. Patient will be admitted to the hospital for further management.

## 2024-09-25 NOTE — CONSULTS
[facility] - Emergency Department  Gastroenterology  Consult Note    Patient Name: Roberth Alberto  MRN: [id number]  Admission Date: 9/25/2024  Hospital Length of Stay: 0 days  Code Status: Full Code   Attending Provider: Michael Peñaloza Jr., MD   Consulting Provider: Thomas Junior  Primary Care Physician: Yessy Pruitt MD (Inactive)  Principal Problem:<principal problem not specified>    Inpatient consult to Gastroenterology  Consult performed by: Janice Gold FNP  Consult ordered by: Efra Muniz MD  Reason for consult: GIB  Assessment/Recommendations: Pt seen and examined. Agree with findings of note as detailed below.82 yo male with hx of diverticulosis bleed in past is admitted after onset of painless bright red hematochezia yesterday with 2 episodes. Hemodynamics have been stable and Hgb is 11. He states he last had similar episode 5 years ago here with colonoscopy done though I don't see report in EHR. Weight has been stable. He is on anticoagulation with Eliquis for Afib with dose last taken last night.Exam-VSS. Abdomen soft, ND/NT, BS normal.lab and imaging reviewed.    Lower GI bleed- appears consistent with diverticular bleed. Agree with holding anticoagulation and monitor clinically for now. Consider colonoscopy after 3 days off Eliquis depending on clinical course.              Subjective:     HPI:  Mr Alberto is an 82 yo male who is admitted to the hospital with onset of rectal bleeding. Patient is a good historian therefore information is obtained from patient interview as well as chart review. Patient has a prior history of hypertension and atrial fibrillation on Eliquis. Patient was in his usual state of health until yesterday morning when he woke up. He states he felt good and went to have a bowel movement but as he flushed he thought he saw some blood in the stool. He states he went on about his day and felt well. He got up this morning and per his routine had another BM  but this time was definetly noted to have some dark red blood mixed in stool in a moderate amount. He had no associated abdominal pain, nausea or vomiting. States his weight has been stable. States he has no increased reflux or other GI symptoms at this time. He became concerned however due to the bleeding and presented to the ER for evaluation. His last dose of Eliquis was last night. On admission, labs were obtained and HH is stable at 11/33. BCR is at 17. 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 He cannot recall his last colonoscopy but states he was told he shouldn't have to repeat it last time it was done with only diverticulosis. He indicates he has had a prior diverticular bleed but can't give specifics at this time.     Past Medical History:   Diagnosis Date    Hypertension        History reviewed. No pertinent surgical history.    Review of patient's allergies indicates:  No Known Allergies  Family History    None       Tobacco Use    Smoking status: Never    Smokeless tobacco: Never   Substance and Sexual Activity    Alcohol use: Never    Drug use: Never    Sexual activity: Not on file     Review of Systems   Constitutional:  Negative for activity change, appetite change, chills and fever.   HENT:  Negative for sore throat and trouble swallowing.    Respiratory:  Negative for apnea, cough and shortness of breath.    Cardiovascular:  Negative for chest pain.   Gastrointestinal:  Positive for anal bleeding and blood in stool. Negative for abdominal distention, abdominal pain, constipation, diarrhea, nausea, rectal pain and vomiting.   Genitourinary:  Negative for dysuria, frequency, hematuria and urgency.   Musculoskeletal:  Negative for arthralgias and myalgias.   Skin:  Negative for color change.   Neurological:  Negative for weakness and headaches.   Psychiatric/Behavioral:  Negative for agitation, behavioral  problems and confusion.      Objective:     Vital Signs (Most Recent):  Temp: 98 °F (36.7 °C) (09/25/24 0929)  Pulse: 78 (09/25/24 0929)  Resp: 18 (09/25/24 0929)  BP: (!) 167/78 (09/25/24 0929)  SpO2: 97 % (09/25/24 0929) Vital Signs (24h Range):  Temp:  [98 °F (36.7 °C)] 98 °F (36.7 °C)  Pulse:  [78] 78  Resp:  [18] 18  SpO2:  [97 %] 97 %  BP: (167)/(78) 167/78     Weight: 84.8 kg (187 lb) (09/25/24 0929)  Body mass index is 28.43 kg/m².    No intake or output data in the 24 hours ending 09/25/24 1550    Lines/Drains/Airways       Peripheral Intravenous Line  Duration                  Peripheral IV - Single Lumen 09/25/24 1016 20 G Anterior;Distal;Right Upper Arm <1 day                     Physical Exam  Vitals and nursing note reviewed.   Constitutional:       General: He is not in acute distress.     Appearance: Normal appearance. He is normal weight. He is not ill-appearing, toxic-appearing or diaphoretic.   HENT:      Head: Normocephalic.      Nose: Nose normal. No congestion or rhinorrhea.      Mouth/Throat:      Mouth: Mucous membranes are moist.      Pharynx: Oropharynx is clear. No oropharyngeal exudate or posterior oropharyngeal erythema.   Eyes:      General: No scleral icterus.  Cardiovascular:      Rate and Rhythm: Normal rate and regular rhythm.   Pulmonary:      Effort: Pulmonary effort is normal.      Breath sounds: No wheezing, rhonchi or rales.   Abdominal:      General: Abdomen is flat. Bowel sounds are normal. There is no distension.      Palpations: Abdomen is soft.      Tenderness: There is no abdominal tenderness.   Musculoskeletal:         General: Normal range of motion.      Cervical back: Normal range of motion.      Right lower leg: No edema.      Left lower leg: No edema.   Skin:     General: Skin is warm and dry.      Coloration: Skin is not jaundiced.   Neurological:      General: No focal deficit present.      Mental Status: He is alert and oriented to person, place, and time. Mental  status is at baseline.      Motor: No weakness.   Psychiatric:         Mood and Affect: Mood normal.         Behavior: Behavior normal.         Thought Content: Thought content normal.         Judgment: Judgment normal.          Significant Labs:  All pertinent lab results from the last 24 hours have been reviewed.    Significant Imaging:  Imaging results within the past 24 hours have been reviewed.  Assessment/Plan:     GI  GI bleeding  9/25/24-Admitted with two episodes of rectal bleed with this morning being the last. Labs reviewed. HH stable. Note prior history as above. Last dose of Eliquis today. Would recommend holding Eliquis. Monitor HH. PPI. May begin clear liquids. Will review case with Dr Junior with plan and addendum to follow.         Thank you for your consult. I will follow-up with patient. Please contact us if you have any additional questions.    SHANIKA Cook  Gastroenterology  [facility] - Emergency Department

## 2024-09-25 NOTE — ASSESSMENT & PLAN NOTE
Chronic, controlled. Latest blood pressure and vitals reviewed-     Temp:  [98 °F (36.7 °C)]   Pulse:  [78]   Resp:  [18]   BP: (167)/(78)   SpO2:  [97 %] .   Home meds for hypertension were reviewed and noted below.   Hypertension Medications               losartan (COZAAR) 50 MG tablet Take 50 mg by mouth once daily.  TAKE ONE TABLET BY MOUTH DAILY FOR BLOOD PRESSURE STOP HCTZ FOR BLOOD PRESSURE    metoprolol tartrate (LOPRESSOR) 25 MG tablet Take 12.5 mg by mouth 2 (two) times daily.  TAKE ONE-HALF TABLET BY MOUTH 2 TIMES A DAY            While in the hospital, will manage blood pressure as follows; Continue home antihypertensive regimen    Will utilize p.r.n. blood pressure medication only if patient's blood pressure greater than 180/110 and he develops symptoms such as worsening chest pain or shortness of breath.

## 2024-09-25 NOTE — SUBJECTIVE & OBJECTIVE
Past Medical History:   Diagnosis Date    Hypertension        History reviewed. No pertinent surgical history.    Review of patient's allergies indicates:  No Known Allergies  Family History    None       Tobacco Use    Smoking status: Never    Smokeless tobacco: Never   Substance and Sexual Activity    Alcohol use: Never    Drug use: Never    Sexual activity: Not on file     Review of Systems   Constitutional:  Negative for activity change, appetite change, chills and fever.   HENT:  Negative for sore throat and trouble swallowing.    Respiratory:  Negative for apnea, cough and shortness of breath.    Cardiovascular:  Negative for chest pain.   Gastrointestinal:  Positive for anal bleeding and blood in stool. Negative for abdominal distention, abdominal pain, constipation, diarrhea, nausea, rectal pain and vomiting.   Genitourinary:  Negative for dysuria, frequency, hematuria and urgency.   Musculoskeletal:  Negative for arthralgias and myalgias.   Skin:  Negative for color change.   Neurological:  Negative for weakness and headaches.   Psychiatric/Behavioral:  Negative for agitation, behavioral problems and confusion.      Objective:     Vital Signs (Most Recent):  Temp: 98 °F (36.7 °C) (09/25/24 0929)  Pulse: 78 (09/25/24 0929)  Resp: 18 (09/25/24 0929)  BP: (!) 167/78 (09/25/24 0929)  SpO2: 97 % (09/25/24 0929) Vital Signs (24h Range):  Temp:  [98 °F (36.7 °C)] 98 °F (36.7 °C)  Pulse:  [78] 78  Resp:  [18] 18  SpO2:  [97 %] 97 %  BP: (167)/(78) 167/78     Weight: 84.8 kg (187 lb) (09/25/24 0929)  Body mass index is 28.43 kg/m².    No intake or output data in the 24 hours ending 09/25/24 1550    Lines/Drains/Airways       Peripheral Intravenous Line  Duration                  Peripheral IV - Single Lumen 09/25/24 1016 20 G Anterior;Distal;Right Upper Arm <1 day                     Physical Exam  Vitals and nursing note reviewed.   Constitutional:       General: He is not in acute distress.     Appearance: Normal  appearance. He is normal weight. He is not ill-appearing, toxic-appearing or diaphoretic.   HENT:      Head: Normocephalic.      Nose: Nose normal. No congestion or rhinorrhea.      Mouth/Throat:      Mouth: Mucous membranes are moist.      Pharynx: Oropharynx is clear. No oropharyngeal exudate or posterior oropharyngeal erythema.   Eyes:      General: No scleral icterus.  Cardiovascular:      Rate and Rhythm: Normal rate and regular rhythm.   Pulmonary:      Effort: Pulmonary effort is normal.      Breath sounds: No wheezing, rhonchi or rales.   Abdominal:      General: Abdomen is flat. Bowel sounds are normal. There is no distension.      Palpations: Abdomen is soft.      Tenderness: There is no abdominal tenderness.   Musculoskeletal:         General: Normal range of motion.      Cervical back: Normal range of motion.      Right lower leg: No edema.      Left lower leg: No edema.   Skin:     General: Skin is warm and dry.      Coloration: Skin is not jaundiced.   Neurological:      General: No focal deficit present.      Mental Status: He is alert and oriented to person, place, and time. Mental status is at baseline.      Motor: No weakness.   Psychiatric:         Mood and Affect: Mood normal.         Behavior: Behavior normal.         Thought Content: Thought content normal.         Judgment: Judgment normal.          Significant Labs:  All pertinent lab results from the last 24 hours have been reviewed.    Significant Imaging:  Imaging results within the past 24 hours have been reviewed.

## 2024-09-26 LAB
ANION GAP SERPL CALCULATED.3IONS-SCNC: 10 MMOL/L (ref 7–16)
BASOPHILS # BLD AUTO: 0.03 K/UL (ref 0–0.2)
BASOPHILS NFR BLD AUTO: 0.8 % (ref 0–1)
BUN SERPL-MCNC: 16 MG/DL (ref 7–18)
BUN/CREAT SERPL: 20 (ref 6–20)
CALCIUM SERPL-MCNC: 8.4 MG/DL (ref 8.5–10.1)
CHLORIDE SERPL-SCNC: 108 MMOL/L (ref 98–107)
CO2 SERPL-SCNC: 26 MMOL/L (ref 21–32)
CREAT SERPL-MCNC: 0.82 MG/DL (ref 0.7–1.3)
DIFFERENTIAL METHOD BLD: ABNORMAL
EGFR (NO RACE VARIABLE) (RUSH/TITUS): 86 ML/MIN/1.73M2
EOSINOPHIL # BLD AUTO: 0.12 K/UL (ref 0–0.5)
EOSINOPHIL NFR BLD AUTO: 3.3 % (ref 1–4)
ERYTHROCYTE [DISTWIDTH] IN BLOOD BY AUTOMATED COUNT: 12.6 % (ref 11.5–14.5)
EST. AVERAGE GLUCOSE BLD GHB EST-MCNC: 120 MG/DL
GLUCOSE SERPL-MCNC: 99 MG/DL (ref 74–106)
HBA1C MFR BLD HPLC: 5.8 % (ref 4.5–6.6)
HCT VFR BLD AUTO: 26.8 % (ref 40–54)
HCT VFR BLD AUTO: 29 % (ref 40–54)
HCT VFR BLD AUTO: 29.4 % (ref 40–54)
HCT VFR BLD AUTO: 31 % (ref 40–54)
HGB BLD-MCNC: 10.3 G/DL (ref 13.5–18)
HGB BLD-MCNC: 8.9 G/DL (ref 13.5–18)
HGB BLD-MCNC: 9.7 G/DL (ref 13.5–18)
HGB BLD-MCNC: 9.8 G/DL (ref 13.5–18)
IMM GRANULOCYTES # BLD AUTO: 0.01 K/UL (ref 0–0.04)
IMM GRANULOCYTES NFR BLD: 0.3 % (ref 0–0.4)
LYMPHOCYTES # BLD AUTO: 1.29 K/UL (ref 1–4.8)
LYMPHOCYTES NFR BLD AUTO: 35.4 % (ref 27–41)
MCH RBC QN AUTO: 31.4 PG (ref 27–31)
MCHC RBC AUTO-ENTMCNC: 33.3 G/DL (ref 32–36)
MCV RBC AUTO: 94.2 FL (ref 80–96)
MONOCYTES # BLD AUTO: 0.27 K/UL (ref 0–0.8)
MONOCYTES NFR BLD AUTO: 7.4 % (ref 2–6)
MPC BLD CALC-MCNC: 9.5 FL (ref 9.4–12.4)
NEUTROPHILS # BLD AUTO: 1.92 K/UL (ref 1.8–7.7)
NEUTROPHILS NFR BLD AUTO: 52.8 % (ref 53–65)
NRBC # BLD AUTO: 0 X10E3/UL
NRBC, AUTO (.00): 0 %
OCCULT BLOOD: POSITIVE
PLATELET # BLD AUTO: 159 K/UL (ref 150–400)
POTASSIUM SERPL-SCNC: 3.9 MMOL/L (ref 3.5–5.1)
RBC # BLD AUTO: 3.12 M/UL (ref 4.6–6.2)
SODIUM SERPL-SCNC: 140 MMOL/L (ref 136–145)
WBC # BLD AUTO: 3.64 K/UL (ref 4.5–11)

## 2024-09-26 PROCEDURE — 25000003 PHARM REV CODE 250: Performed by: GENERAL PRACTICE

## 2024-09-26 PROCEDURE — 63600175 PHARM REV CODE 636 W HCPCS: Performed by: GENERAL PRACTICE

## 2024-09-26 PROCEDURE — 85014 HEMATOCRIT: CPT | Performed by: GENERAL PRACTICE

## 2024-09-26 PROCEDURE — 99233 SBSQ HOSP IP/OBS HIGH 50: CPT | Mod: GC,,, | Performed by: FAMILY MEDICINE

## 2024-09-26 PROCEDURE — 36415 COLL VENOUS BLD VENIPUNCTURE: CPT | Performed by: GENERAL PRACTICE

## 2024-09-26 PROCEDURE — 85018 HEMOGLOBIN: CPT | Performed by: GENERAL PRACTICE

## 2024-09-26 PROCEDURE — 82272 OCCULT BLD FECES 1-3 TESTS: CPT | Performed by: GENERAL PRACTICE

## 2024-09-26 PROCEDURE — 83036 HEMOGLOBIN GLYCOSYLATED A1C: CPT | Performed by: GENERAL PRACTICE

## 2024-09-26 PROCEDURE — 11000001 HC ACUTE MED/SURG PRIVATE ROOM

## 2024-09-26 PROCEDURE — 99232 SBSQ HOSP IP/OBS MODERATE 35: CPT | Mod: ,,, | Performed by: INTERNAL MEDICINE

## 2024-09-26 PROCEDURE — 85025 COMPLETE CBC W/AUTO DIFF WBC: CPT | Performed by: GENERAL PRACTICE

## 2024-09-26 PROCEDURE — 80048 BASIC METABOLIC PNL TOTAL CA: CPT | Performed by: GENERAL PRACTICE

## 2024-09-26 RX ORDER — FOLIC ACID 1 MG/1
1 TABLET ORAL DAILY
COMMUNITY

## 2024-09-26 RX ORDER — MONTELUKAST SODIUM 10 MG/1
10 TABLET ORAL DAILY
COMMUNITY
Start: 2024-04-04

## 2024-09-26 RX ORDER — AZELASTINE 1 MG/ML
1 SPRAY, METERED NASAL 2 TIMES DAILY
COMMUNITY
Start: 2023-10-04

## 2024-09-26 RX ORDER — MULTIVITAMIN
1 TABLET ORAL DAILY
COMMUNITY

## 2024-09-26 RX ORDER — LANOLIN ALCOHOL/MO/W.PET/CERES
1 CREAM (GRAM) TOPICAL
COMMUNITY

## 2024-09-26 RX ORDER — NAPROXEN SODIUM 220 MG/1
81 TABLET, FILM COATED ORAL DAILY
Status: ON HOLD | COMMUNITY
End: 2024-09-30 | Stop reason: HOSPADM

## 2024-09-26 RX ORDER — HYDROCHLOROTHIAZIDE 25 MG/1
25 TABLET ORAL DAILY
COMMUNITY

## 2024-09-26 RX ORDER — CETIRIZINE HYDROCHLORIDE 10 MG/1
10 TABLET ORAL DAILY
COMMUNITY

## 2024-09-26 RX ORDER — AMOXICILLIN 500 MG
1 CAPSULE ORAL DAILY
COMMUNITY

## 2024-09-26 RX ORDER — METOPROLOL SUCCINATE 25 MG/1
25 TABLET, EXTENDED RELEASE ORAL DAILY
Status: ON HOLD | COMMUNITY
End: 2024-09-30 | Stop reason: HOSPADM

## 2024-09-26 RX ADMIN — SODIUM CHLORIDE: 900 INJECTION, SOLUTION INTRAVENOUS at 07:09

## 2024-09-26 RX ADMIN — METOPROLOL TARTRATE 12.5 MG: 25 TABLET, FILM COATED ORAL at 09:09

## 2024-09-26 RX ADMIN — SERTRALINE HYDROCHLORIDE 50 MG: 50 TABLET ORAL at 08:09

## 2024-09-26 RX ADMIN — ATORVASTATIN CALCIUM 20 MG: 10 TABLET, FILM COATED ORAL at 08:09

## 2024-09-26 RX ADMIN — SODIUM CHLORIDE: 900 INJECTION, SOLUTION INTRAVENOUS at 04:09

## 2024-09-26 RX ADMIN — PANTOPRAZOLE SODIUM 40 MG: 40 INJECTION, POWDER, LYOPHILIZED, FOR SOLUTION INTRAVENOUS at 08:09

## 2024-09-26 RX ADMIN — PANTOPRAZOLE SODIUM 40 MG: 40 INJECTION, POWDER, LYOPHILIZED, FOR SOLUTION INTRAVENOUS at 09:09

## 2024-09-26 NOTE — ASSESSMENT & PLAN NOTE
Patient with Long standing persistent (>12 months) atrial fibrillation which is controlled currently with Beta Blocker. Patient is currently in sinus rhythm.[id number] Score: 2. HASBLED Score: 2. Holding home Eliquis due to acute GI bleeding.

## 2024-09-26 NOTE — PHARMACY MED REC
"Admission Medication History     The home medication history was taken by Mai Calix.    You may go to "Admission" then "Reconcile Home Medications" tabs to review and/or act upon these items.     The home medication list has been updated by the Pharmacy department.   Please read ALL comments highlighted in yellow.   Please address this information as you see fit.    Feel free to contact us if you have any questions or require assistance.  Medications Updated:  Apixaban 5 mg to Apixaban 2.5 mg  DOD records listed 5 mg still, but according to a list patient had from another hospital, it was 2.5 mg  Metoprolol Tartrate 12.5 mg to Metoprolol Succinate 25 mg  See above  Sertraline 50 mg to Sertraline 100 mg  See above  Simvastatin 20 mg to Simvastatin 10 mg  See above  Medications Added:  Hydrochlorothiazide 25 mg  Aspirin 81 mg  Montelukast 10 mg  Cetirizine 10 mg  Azelastine nasal spray  Omega 3  Folic Acid 1 mg  Vitamin D3 2,000 iu  Ferrous Sulfate 325 mg  Multivitamin      Medications listed below were obtained from: Patient/family, Analytic software- [employer], and Medical records  (Not in a hospital admission)        Current Outpatient Medications on File Prior to Encounter   Medication Sig Dispense Refill Last Dose    apixaban (ELIQUIS) 5 mg Tab Take 2.5 mg by mouth 2 (two) times daily.   9/24/2024    azelastine (ASTELIN) 137 mcg (0.1 %) nasal spray 1 spray by Nasal route 2 (two) times daily.       cetirizine (ZYRTEC) 10 MG tablet Take 10 mg by mouth once daily.   9/24/2024    cholecalciferol, vitamin D3, (VITAMIN D3 ORAL) Take by mouth.   9/24/2024    ferrous sulfate (FEOSOL) Tab tablet Take 1 tablet by mouth daily with breakfast.   9/24/2024    hydroCHLOROthiazide (HYDRODIURIL) 25 MG tablet Take 25 mg by mouth once daily.   9/24/2024    losartan (COZAAR) 50 MG tablet Take 50 mg by mouth once daily.  TAKE ONE TABLET BY MOUTH DAILY FOR BLOOD PRESSURE STOP HCTZ FOR BLOOD PRESSURE   9/24/2024    metoprolol " succinate (TOPROL-XL) 25 MG 24 hr tablet Take 25 mg by mouth once daily.   9/24/2024    montelukast (SINGULAIR) 10 mg tablet Take 10 mg by mouth once daily.       multivitamin (THERAGRAN) per tablet Take 1 tablet by mouth once daily.   9/24/2024    omega-3 fatty acids/fish oil (FISH OIL-OMEGA-3 FATTY ACIDS) 300-1,000 mg capsule Take 1 capsule by mouth once daily.   9/24/2024    sertraline (ZOLOFT) 50 MG tablet Take 100 mg by mouth once daily.   9/24/2024    simvastatin (ZOCOR) 40 MG tablet Take 10 mg by mouth every evening.  TAKE ONE-HALF TABLET BY MOUTH EVERY EVENING FOR CHOLESTEROL   9/24/2024    [DISCONTINUED] metoprolol tartrate (LOPRESSOR) 25 MG tablet Take 12.5 mg by mouth 2 (two) times daily.  TAKE ONE-HALF TABLET BY MOUTH 2 TIMES A DAY   9/24/2024    folic acid (FOLVITE) 1 MG tablet Take 1 mg by mouth once daily.            Potential issues to be addressed PRIOR TO DISCHARGE  No issues identified.    Mai Calix  Medication Access Specialist  EXT. [phone]    .

## 2024-09-26 NOTE — ASSESSMENT & PLAN NOTE
Chronic, controlled. Latest blood pressure and vitals reviewed-     Temp:  [97.9 °F (36.6 °C)-100.2 °F (37.9 °C)]   Pulse:  [65-86]   Resp:  [15-22]   BP: (113-152)/(50-79)   SpO2:  [94 %-98 %] .   Home meds for hypertension were reviewed and noted below.   Hypertension Medications               losartan (COZAAR) 50 MG tablet Take 50 mg by mouth once daily.  TAKE ONE TABLET BY MOUTH DAILY FOR BLOOD PRESSURE STOP HCTZ FOR BLOOD PRESSURE    metoprolol tartrate (LOPRESSOR) 25 MG tablet Take 12.5 mg by mouth 2 (two) times daily.  TAKE ONE-HALF TABLET BY MOUTH 2 TIMES A DAY            While in the hospital, will manage blood pressure as follows; Continue home antihypertensive regimen    Will utilize p.r.n. blood pressure medication only if patient's blood pressure greater than 180/110 and he develops symptoms such as worsening chest pain or shortness of breath.

## 2024-09-26 NOTE — PROGRESS NOTES
[facility] - Emergency Department  Gastroenterology  Progress Note    Patient Name: Roberth Alberto  MRN: [id number]  Admission Date: 9/25/2024  Hospital Length of Stay: 1 days  Code Status: Full Code   Attending Provider: Michael Peñaloza Jr., MD  Consulting Provider: Thomas Junior MD  Primary Care Physician: Yessy Pruitt MD (Inactive)  Principal Problem: Acute GI bleeding    Patient seen and examined.  Agree with findings as noted detailed below.  He has had 3 episodes of maroon-colored hematochezia over past 24 hours.  He denies abdominal pain.  Tolerating clear liquid diet.  Hemoglobin stable at 10.  He and family now state that his last dose of Eliquis was Tuesday night 2 days ago.  On exam hemodynamically normal.  Abdomen is soft and nondistended/nontender with bowel sounds present.  Lower GI bleed-currently stable.  Continue observation with serial hemoglobins.  Would recommend colonoscopy at some point but would hold at least another day given Eliquis therapy and his clinical stability.  Continue clear liquid diet for now.      9/26/24-patient is seen resting in bed awake and alert. States he is feeling okay however he has had 3 episodes of rectal bleeding since yesterday but describes it to be more maroon in color. Denies any abdominal pain. HH is stable at 10/31. He is tolerating a clear liquid diet at this time.     Past Medical History:   Diagnosis Date    Hypertension        History reviewed. No pertinent surgical history.    Review of patient's allergies indicates:  No Known Allergies  Family History    None       Tobacco Use    Smoking status: Never    Smokeless tobacco: Never   Substance and Sexual Activity    Alcohol use: Never    Drug use: Never    Sexual activity: Not on file     Review of Systems   Constitutional:  Negative for activity change, appetite change, chills and fever.   HENT:  Negative for sore throat and trouble swallowing.    Respiratory:  Negative for apnea, cough  and shortness of breath.    Cardiovascular:  Negative for chest pain.   Gastrointestinal:  Positive for anal bleeding and blood in stool. Negative for abdominal distention, abdominal pain, constipation, diarrhea, nausea, rectal pain and vomiting.   Genitourinary:  Negative for dysuria, frequency, hematuria and urgency.   Musculoskeletal:  Negative for arthralgias and myalgias.   Skin:  Negative for color change.   Neurological:  Negative for weakness and headaches.   Psychiatric/Behavioral:  Negative for agitation, behavioral problems and confusion.      Objective:     Vital Signs (Most Recent):  Temp: 99.1 °F (37.3 °C) (09/26/24 1215)  Pulse: 86 (09/26/24 1236)  Resp: 15 (09/26/24 1236)  BP: (!) 130/58 (09/26/24 1236)  SpO2: 97 % (09/26/24 1236) Vital Signs (24h Range):  Temp:  [97.9 °F (36.6 °C)-100.2 °F (37.9 °C)] 99.1 °F (37.3 °C)  Pulse:  [65-86] 86  Resp:  [15-22] 15  SpO2:  [94 %-98 %] 97 %  BP: (113-152)/(50-79) 130/58     Weight: 84.8 kg (187 lb) (09/25/24 0929)  Body mass index is 28.43 kg/m².    No intake or output data in the 24 hours ending 09/26/24 1500    Lines/Drains/Airways       Peripheral Intravenous Line  Duration                  Peripheral IV - Single Lumen 09/25/24 1016 20 G Anterior;Distal;Right Upper Arm 1 day                     Physical Exam  Vitals and nursing note reviewed.   Constitutional:       General: He is not in acute distress.     Appearance: Normal appearance. He is normal weight. He is not ill-appearing, toxic-appearing or diaphoretic.   HENT:      Head: Normocephalic.      Nose: Nose normal. No congestion or rhinorrhea.      Mouth/Throat:      Mouth: Mucous membranes are moist.      Pharynx: Oropharynx is clear. No oropharyngeal exudate or posterior oropharyngeal erythema.   Eyes:      General: No scleral icterus.  Cardiovascular:      Rate and Rhythm: Normal rate and regular rhythm.   Pulmonary:      Effort: Pulmonary effort is normal.      Breath sounds: No wheezing, rhonchi  or rales.   Abdominal:      General: Abdomen is flat. Bowel sounds are normal. There is no distension.      Palpations: Abdomen is soft.      Tenderness: There is no abdominal tenderness.   Musculoskeletal:         General: Normal range of motion.      Cervical back: Normal range of motion.      Right lower leg: No edema.      Left lower leg: No edema.   Skin:     General: Skin is warm and dry.      Coloration: Skin is not jaundiced.   Neurological:      General: No focal deficit present.      Mental Status: He is alert and oriented to person, place, and time. Mental status is at baseline.      Motor: No weakness.   Psychiatric:         Mood and Affect: Mood normal.         Behavior: Behavior normal.         Thought Content: Thought content normal.         Judgment: Judgment normal.          Significant Labs:  All pertinent lab results from the last 24 hours have been reviewed.    Significant Imaging:  Imaging results within the past 24 hours have been reviewed.  Assessment/Plan:     GI  * Acute GI bleeding  9/26/24-continued rectal bleeding reported. Labs noted and reviewed. Eliquis remains on hold. Continue with clear liquid diet. Consider colonoscopy once Eliquis held x 3 days. Continue with current treatment plan. Plan and addendum to follow by Dr Junior.     9/25/24-Admitted with two episodes of rectal bleed with this morning being the last. Labs reviewed. HH stable. Note prior history as above. Last dose of Eliquis today. Would recommend holding Eliquis. Monitor HH. PPI. May begin clear liquids. Will review case with Dr Junior with plan and addendum to follow.         Thank you for your consult. I will follow-up with patient. Please contact us if you have any additional questions.    SHANIKA Cook  Gastroenterology  [facility] - Emergency Department

## 2024-09-26 NOTE — PROGRESS NOTES
[facility] - Emergency Department  [facility] Medicine  Progress Note    Patient Name: Roberth Alberto  MRN: [id number]  Patient Class: IP- Inpatient   Admission Date: 9/25/2024  Length of Stay: 1 days  Attending Physician: Michael Peñaloza Jr., MD  Primary Care Provider: Yessy Pruitt MD (Inactive)        Subjective:     Principal Problem:Gastrointestinal hemorrhage        HPI:  Patient is a 82 y/o male with PMHx of Afib on Eliquis, HTN, anxiety, GERD s/p esophageal dilation who presents to the ED with complaint of blood per rectum. Patient reports having dark blood per rectum with bowel movements since yesterday morning. He reports having GI bleed x3 in the past that required blood transfusion, last GI bleed was last year. He had esophageal dilation one month ago in [place] where he spends time with his significant other. Patient follows with cardiology, Dr. Kam, at [facility]. Patient denies smoking or heavy alcohol use. He lives alone. Patient denies fever, chills, nausea, vomiting, SOB, chest pain or urinary habit changes.    In the ED vital signs showed /78, HR 78, RR 18, SpO2 97% and afebrile. Blood work H/H 11/33, WBC 3.48, . No electrolyte abnormalities. BUN/Cr 19/1.13, glucose 110. FOBT positive. Patient will be admitted to the hospital for further management.    Overview/Hospital Course:  No notes on file    Interval History: Pt still passing bloody stool. Monitor H/H.    Review of Systems   Constitutional:  Negative for activity change, appetite change, chills and fever.   HENT:  Negative for sore throat and trouble swallowing.    Respiratory:  Negative for apnea, cough and shortness of breath.    Cardiovascular:  Negative for chest pain.   Gastrointestinal:  Positive for anal bleeding and blood in stool. Negative for abdominal distention, abdominal pain, constipation, diarrhea, nausea, rectal pain and vomiting.   Genitourinary:  Negative for dysuria, frequency, hematuria and  urgency.   Musculoskeletal:  Negative for arthralgias and myalgias.   Skin:  Negative for color change.   Neurological:  Negative for weakness and headaches.   Psychiatric/Behavioral:  Negative for agitation, behavioral problems and confusion.      Objective:     Vital Signs (Most Recent):  Temp: 99.1 °F (37.3 °C) (09/26/24 1215)  Pulse: 86 (09/26/24 1236)  Resp: 15 (09/26/24 1236)  BP: (!) 130/58 (09/26/24 1236)  SpO2: 97 % (09/26/24 1236) Vital Signs (24h Range):  Temp:  [97.9 °F (36.6 °C)-100.2 °F (37.9 °C)] 99.1 °F (37.3 °C)  Pulse:  [65-86] 86  Resp:  [15-22] 15  SpO2:  [94 %-98 %] 97 %  BP: (113-152)/(50-79) 130/58     Weight: 84.8 kg (187 lb)  Body mass index is 28.43 kg/m².  No intake or output data in the 24 hours ending 09/26/24 1829      Physical Exam  Vitals and nursing note reviewed.   Constitutional:       General: He is not in acute distress.     Appearance: Normal appearance. He is normal weight. He is not ill-appearing, toxic-appearing or diaphoretic.   HENT:      Head: Normocephalic and atraumatic.      Right Ear: External ear normal.      Left Ear: External ear normal.      Nose: Nose normal. No congestion or rhinorrhea.      Mouth/Throat:      Mouth: Mucous membranes are moist.      Pharynx: Oropharynx is clear. No oropharyngeal exudate or posterior oropharyngeal erythema.   Eyes:      General: No scleral icterus.     Extraocular Movements: Extraocular movements intact.      Conjunctiva/sclera: Conjunctivae normal.      Pupils: Pupils are equal, round, and reactive to light.   Cardiovascular:      Rate and Rhythm: Normal rate and regular rhythm.      Pulses: Normal pulses.      Heart sounds: Normal heart sounds. No murmur heard.  Pulmonary:      Effort: Pulmonary effort is normal. No respiratory distress.      Breath sounds: No wheezing, rhonchi or rales.   Abdominal:      General: Abdomen is flat. Bowel sounds are normal. There is no distension.      Palpations: Abdomen is soft.      Tenderness:  There is no abdominal tenderness. There is no right CVA tenderness, left CVA tenderness, guarding or rebound.   Musculoskeletal:         General: No swelling. Normal range of motion.      Cervical back: Neck supple. No rigidity or tenderness.      Right lower leg: No edema.      Left lower leg: No edema.   Skin:     General: Skin is warm and dry.      Capillary Refill: Capillary refill takes less than 2 seconds.      Coloration: Skin is not jaundiced.      Findings: No lesion or rash.   Neurological:      General: No focal deficit present.      Mental Status: He is alert and oriented to person, place, and time.      Cranial Nerves: No cranial nerve deficit.      Sensory: No sensory deficit.      Motor: No weakness.   Psychiatric:         Mood and Affect: Mood normal.         Behavior: Behavior normal.         Thought Content: Thought content normal.             Significant Labs: All pertinent labs within the past 24 hours have been reviewed.  Recent Lab Results         09/26/24  0848   09/26/24  0842   09/26/24  0423   09/26/24  0005        Anion Gap     10         Baso #     0.03         Basophil %     0.8         BUN     16         BUN/CREAT RATIO     20         Calcium     8.4         Chloride     108         CO2     26         Creatinine     0.82         Differential Method     Auto         eGFR     86         Eos #     0.12         Eos %     3.3         Estimated Avg Glucose     120         Glucose     99         Hematocrit 31.0     29.4   29.0       Hemoglobin 10.3     9.8   9.7       Hemoglobin A1C External     5.8  Comment:   Normal:               <5.7%  Pre-Diabetic:       5.7% to 6.4%  Diabetic:             >6.4%  Diabetic Goal:     <7%         Immature Grans (Abs)     0.01         Immature Granulocytes     0.3         Lymph #     1.29         Lymph %     35.4         MCH     31.4         MCHC     33.3         MCV     94.2         Mono #     0.27         Mono %     7.4         MPV     9.5          Neutrophils, Abs     1.92         Neutrophils Relative     52.8         nRBC     0.0         NUCLEATED RBC ABSOLUTE     0.00         Occult Blood   Positive           Platelet Count     159         Potassium     3.9         RBC     3.12         RDW     12.6         Sodium     140         WBC     3.64                 Significant Imaging: I have reviewed all pertinent imaging results/findings within the past 24 hours.  I have reviewed and interpreted all pertinent imaging results/findings within the past 24 hours.    Assessment/Plan:      * Gastrointestinal hemorrhage  Patient has acute blood loss due to hemorrhage, the hemorrhage is due to gastrointestinal bleed, patient does have a propensity for bleeding due to a medication, the medication is Eliquis. Will trend hemoglobin/hematocrit Every 8 hours, as well as monitor and correct for any coagulation defects. CBC and vital signs have been reviewed and last CBC was noted-   Lab Results   Component Value Date    WBC 3.48 (L) 09/25/2024    HGB 11.1 (L) 09/25/2024    HCT 33.3 (L) 09/25/2024    MCV 96.0 09/25/2024     09/25/2024       Hold home Eliquis.  IV Protonix 40mg BID.  Will order a type and screen and consent patient for blood transfusion. Will transfuse if Hgb is <7g/dl (<8g/dl in cases of active ACS) or if patient has rapid bleeding leading to hemodynamic instability.  GI consult, recommendations appreciated.    9/26  Per GI: Continue holding Eliquis; Continue with clear liquid diet; Consider colonoscopy once Eliquis held x 3 days   -Monitor H/H daily    Anxiety  Continue home sertraline.      Atrial fibrillation  Patient with Long standing persistent (>12 months) atrial fibrillation which is controlled currently with Beta Blocker. Patient is currently in sinus rhythm.[id number] Score: 2. HASBLED Score: 2. Holding home Eliquis due to acute GI bleeding.    Primary hypertension  Chronic, controlled. Latest blood pressure and vitals reviewed-     Temp:   [97.9 °F (36.6 °C)-100.2 °F (37.9 °C)]   Pulse:  [65-86]   Resp:  [15-22]   BP: (113-152)/(50-79)   SpO2:  [94 %-98 %] .   Home meds for hypertension were reviewed and noted below.   Hypertension Medications               losartan (COZAAR) 50 MG tablet Take 50 mg by mouth once daily.  TAKE ONE TABLET BY MOUTH DAILY FOR BLOOD PRESSURE STOP HCTZ FOR BLOOD PRESSURE    metoprolol tartrate (LOPRESSOR) 25 MG tablet Take 12.5 mg by mouth 2 (two) times daily.  TAKE ONE-HALF TABLET BY MOUTH 2 TIMES A DAY            While in the hospital, will manage blood pressure as follows; Continue home antihypertensive regimen    Will utilize p.r.n. blood pressure medication only if patient's blood pressure greater than 180/110 and he develops symptoms such as worsening chest pain or shortness of breath.      VTE Risk Mitigation (From admission, onward)           Ordered     Reason for No Pharmacological VTE Prophylaxis  Once        Question:  Reasons:  Answer:  Active Bleeding    09/25/24 1331     IP VTE HIGH RISK PATIENT  Once         09/25/24 1331     Place sequential compression device  Until discontinued         09/25/24 1331                    Discharge Planning   MAKAYLA:      Code Status: Full Code   Is the patient medically ready for discharge?:     Reason for patient still in hospital (select all that apply): Treatment  Discharge Plan A: Home                  Christopher Henry Jr., MD  Department of Hospital Medicine   [facility] Emergency Department

## 2024-09-26 NOTE — ASSESSMENT & PLAN NOTE
9/26/24-continued rectal bleeding reported. Labs noted and reviewed. Eliquis remains on hold. Continue with clear liquid diet. Consider colonoscopy once Eliquis held x 3 days. Continue with current treatment plan. Plan and addendum to follow by Dr Junior.     9/25/24-Admitted with two episodes of rectal bleed with this morning being the last. Labs reviewed. HH stable. Note prior history as above. Last dose of Eliquis today. Would recommend holding Eliquis. Monitor HH. PPI. May begin clear liquids. Will review case with Dr Junior with plan and addendum to follow.

## 2024-09-26 NOTE — SUBJECTIVE & OBJECTIVE
Interval History: Pt still passing bloody stool. Monitor H/H.    Review of Systems   Constitutional:  Negative for activity change, appetite change, chills and fever.   HENT:  Negative for sore throat and trouble swallowing.    Respiratory:  Negative for apnea, cough and shortness of breath.    Cardiovascular:  Negative for chest pain.   Gastrointestinal:  Positive for anal bleeding and blood in stool. Negative for abdominal distention, abdominal pain, constipation, diarrhea, nausea, rectal pain and vomiting.   Genitourinary:  Negative for dysuria, frequency, hematuria and urgency.   Musculoskeletal:  Negative for arthralgias and myalgias.   Skin:  Negative for color change.   Neurological:  Negative for weakness and headaches.   Psychiatric/Behavioral:  Negative for agitation, behavioral problems and confusion.      Objective:     Vital Signs (Most Recent):  Temp: 99.1 °F (37.3 °C) (09/26/24 1215)  Pulse: 86 (09/26/24 1236)  Resp: 15 (09/26/24 1236)  BP: (!) 130/58 (09/26/24 1236)  SpO2: 97 % (09/26/24 1236) Vital Signs (24h Range):  Temp:  [97.9 °F (36.6 °C)-100.2 °F (37.9 °C)] 99.1 °F (37.3 °C)  Pulse:  [65-86] 86  Resp:  [15-22] 15  SpO2:  [94 %-98 %] 97 %  BP: (113-152)/(50-79) 130/58     Weight: 84.8 kg (187 lb)  Body mass index is 28.43 kg/m².  No intake or output data in the 24 hours ending 09/26/24 8839      Physical Exam  Vitals and nursing note reviewed.   Constitutional:       General: He is not in acute distress.     Appearance: Normal appearance. He is normal weight. He is not ill-appearing, toxic-appearing or diaphoretic.   HENT:      Head: Normocephalic and atraumatic.      Right Ear: External ear normal.      Left Ear: External ear normal.      Nose: Nose normal. No congestion or rhinorrhea.      Mouth/Throat:      Mouth: Mucous membranes are moist.      Pharynx: Oropharynx is clear. No oropharyngeal exudate or posterior oropharyngeal erythema.   Eyes:      General: No scleral icterus.      Extraocular Movements: Extraocular movements intact.      Conjunctiva/sclera: Conjunctivae normal.      Pupils: Pupils are equal, round, and reactive to light.   Cardiovascular:      Rate and Rhythm: Normal rate and regular rhythm.      Pulses: Normal pulses.      Heart sounds: Normal heart sounds. No murmur heard.  Pulmonary:      Effort: Pulmonary effort is normal. No respiratory distress.      Breath sounds: No wheezing, rhonchi or rales.   Abdominal:      General: Abdomen is flat. Bowel sounds are normal. There is no distension.      Palpations: Abdomen is soft.      Tenderness: There is no abdominal tenderness. There is no right CVA tenderness, left CVA tenderness, guarding or rebound.   Musculoskeletal:         General: No swelling. Normal range of motion.      Cervical back: Neck supple. No rigidity or tenderness.      Right lower leg: No edema.      Left lower leg: No edema.   Skin:     General: Skin is warm and dry.      Capillary Refill: Capillary refill takes less than 2 seconds.      Coloration: Skin is not jaundiced.      Findings: No lesion or rash.   Neurological:      General: No focal deficit present.      Mental Status: He is alert and oriented to person, place, and time.      Cranial Nerves: No cranial nerve deficit.      Sensory: No sensory deficit.      Motor: No weakness.   Psychiatric:         Mood and Affect: Mood normal.         Behavior: Behavior normal.         Thought Content: Thought content normal.             Significant Labs: All pertinent labs within the past 24 hours have been reviewed.  Recent Lab Results         09/26/24  0848   09/26/24  0842   09/26/24  0423   09/26/24  0005        Anion Gap     10         Baso #     0.03         Basophil %     0.8         BUN     16         BUN/CREAT RATIO     20         Calcium     8.4         Chloride     108         CO2     26         Creatinine     0.82         Differential Method     Auto         eGFR     86         Eos #     0.12          Eos %     3.3         Estimated Avg Glucose     120         Glucose     99         Hematocrit 31.0     29.4   29.0       Hemoglobin 10.3     9.8   9.7       Hemoglobin A1C External     5.8  Comment:   Normal:               <5.7%  Pre-Diabetic:       5.7% to 6.4%  Diabetic:             >6.4%  Diabetic Goal:     <7%         Immature Grans (Abs)     0.01         Immature Granulocytes     0.3         Lymph #     1.29         Lymph %     35.4         MCH     31.4         MCHC     33.3         MCV     94.2         Mono #     0.27         Mono %     7.4         MPV     9.5         Neutrophils, Abs     1.92         Neutrophils Relative     52.8         nRBC     0.0         NUCLEATED RBC ABSOLUTE     0.00         Occult Blood   Positive           Platelet Count     159         Potassium     3.9         RBC     3.12         RDW     12.6         Sodium     140         WBC     3.64                 Significant Imaging: I have reviewed all pertinent imaging results/findings within the past 24 hours.  I have reviewed and interpreted all pertinent imaging results/findings within the past 24 hours.

## 2024-09-26 NOTE — SUBJECTIVE & OBJECTIVE
Past Medical History:   Diagnosis Date    Hypertension        History reviewed. No pertinent surgical history.    Review of patient's allergies indicates:  No Known Allergies  Family History    None       Tobacco Use    Smoking status: Never    Smokeless tobacco: Never   Substance and Sexual Activity    Alcohol use: Never    Drug use: Never    Sexual activity: Not on file     Review of Systems   Constitutional:  Negative for activity change, appetite change, chills and fever.   HENT:  Negative for sore throat and trouble swallowing.    Respiratory:  Negative for apnea, cough and shortness of breath.    Cardiovascular:  Negative for chest pain.   Gastrointestinal:  Positive for anal bleeding and blood in stool. Negative for abdominal distention, abdominal pain, constipation, diarrhea, nausea, rectal pain and vomiting.   Genitourinary:  Negative for dysuria, frequency, hematuria and urgency.   Musculoskeletal:  Negative for arthralgias and myalgias.   Skin:  Negative for color change.   Neurological:  Negative for weakness and headaches.   Psychiatric/Behavioral:  Negative for agitation, behavioral problems and confusion.      Objective:     Vital Signs (Most Recent):  Temp: 99.1 °F (37.3 °C) (09/26/24 1215)  Pulse: 86 (09/26/24 1236)  Resp: 15 (09/26/24 1236)  BP: (!) 130/58 (09/26/24 1236)  SpO2: 97 % (09/26/24 1236) Vital Signs (24h Range):  Temp:  [97.9 °F (36.6 °C)-100.2 °F (37.9 °C)] 99.1 °F (37.3 °C)  Pulse:  [65-86] 86  Resp:  [15-22] 15  SpO2:  [94 %-98 %] 97 %  BP: (113-152)/(50-79) 130/58     Weight: 84.8 kg (187 lb) (09/25/24 0929)  Body mass index is 28.43 kg/m².    No intake or output data in the 24 hours ending 09/26/24 1500    Lines/Drains/Airways       Peripheral Intravenous Line  Duration                  Peripheral IV - Single Lumen 09/25/24 1016 20 G Anterior;Distal;Right Upper Arm 1 day                     Physical Exam  Vitals and nursing note reviewed.   Constitutional:       General: He is not  in acute distress.     Appearance: Normal appearance. He is normal weight. He is not ill-appearing, toxic-appearing or diaphoretic.   HENT:      Head: Normocephalic.      Nose: Nose normal. No congestion or rhinorrhea.      Mouth/Throat:      Mouth: Mucous membranes are moist.      Pharynx: Oropharynx is clear. No oropharyngeal exudate or posterior oropharyngeal erythema.   Eyes:      General: No scleral icterus.  Cardiovascular:      Rate and Rhythm: Normal rate and regular rhythm.   Pulmonary:      Effort: Pulmonary effort is normal.      Breath sounds: No wheezing, rhonchi or rales.   Abdominal:      General: Abdomen is flat. Bowel sounds are normal. There is no distension.      Palpations: Abdomen is soft.      Tenderness: There is no abdominal tenderness.   Musculoskeletal:         General: Normal range of motion.      Cervical back: Normal range of motion.      Right lower leg: No edema.      Left lower leg: No edema.   Skin:     General: Skin is warm and dry.      Coloration: Skin is not jaundiced.   Neurological:      General: No focal deficit present.      Mental Status: He is alert and oriented to person, place, and time. Mental status is at baseline.      Motor: No weakness.   Psychiatric:         Mood and Affect: Mood normal.         Behavior: Behavior normal.         Thought Content: Thought content normal.         Judgment: Judgment normal.          Significant Labs:  All pertinent lab results from the last 24 hours have been reviewed.    Significant Imaging:  Imaging results within the past 24 hours have been reviewed.

## 2024-09-26 NOTE — PROGRESS NOTES
Patient was identified at risk by screening criteria with MST2. He endorsed he was unsure of any weight loss. Patient is currently 84.8kg with a  BMI of 28.43 and is overweight. Chart review reveals no significant weight changes in the past six months and has no evident fat or muscle depletion. Per ASPEN guidelines, patient does not meet criteria for malnutrition at this time.

## 2024-09-26 NOTE — PLAN OF CARE
[facility] - Emergency Department  Initial Discharge Assessment       Primary Care Provider: Yessy Pruitt MD (Inactive)    Admission Diagnosis: GI bleeding [K92.2]    Admission Date: 9/25/2024  Expected Discharge Date:     Transition of Care Barriers: None    Payor: [facility] MCARE / Plan: [facility] MEDICARE COMPLETE / Product Type: Medicare Advantage /     Extended Emergency Contact Information  Primary Emergency Contact: Griselda Howard  Address: [address] [place] [address], [place] [address] [facility]  Home Phone: [phone]  Mobile Phone: [phone]  Relation: Significant other  Preferred language: English   needed? No    Discharge Plan A: Home  Discharge Plan B: Home      [id number] [place] - [address] [facility] [address]  [address]  [place] [address]  Phone: [phone] Fax: [phone]      Initial Assessment (most recent)       Adult Discharge Assessment - 09/26/24 1102          Discharge Assessment    Assessment Type Discharge Planning Assessment     Source of Information patient     Communicated MAKAYLA with patient/caregiver Date not available/Unable to determine     People in Home alone     Facility Arrived From: Home     Do you expect to return to your current living situation? Yes     Do you have help at home or someone to help you manage your care at home? No     Prior to hospitilization cognitive status: Unable to Assess     Current cognitive status: Alert/Oriented     Walking or Climbing Stairs Difficulty no     Dressing/Bathing Difficulty no     Home Accessibility wheelchair accessible     Home Layout Able to live on 1st floor     Equipment Currently Used at Home none     Patient currently being followed by outpatient case management? No     Do you currently have service(s) that help you manage your care at home? No     Do you take prescription medications? Yes     Do you have prescription  coverage? Yes     Coverage [facility]     Do you have any problems affording any of your prescribed medications? No     Is the patient taking medications as prescribed? yes     Who is going to help you get home at discharge? family     How do you get to doctors appointments? car, drives self     Are you on dialysis? No     Discharge Plan A Home     Discharge Plan B Home     DME Needed Upon Discharge  none     Discharge Plan discussed with: Patient     Transition of Care Barriers None                        CM spoke with pt in the ER. Pt lives at home alone. Not current with home health, no DME pta. Discharge plan is to return home with no anticipated needs. SS will follow for discharge needs as they arise.

## 2024-09-27 LAB
ANION GAP SERPL CALCULATED.3IONS-SCNC: 6 MMOL/L (ref 7–16)
BASOPHILS # BLD AUTO: 0.02 K/UL (ref 0–0.2)
BASOPHILS NFR BLD AUTO: 0.5 % (ref 0–1)
BUN SERPL-MCNC: 10 MG/DL (ref 7–18)
BUN/CREAT SERPL: 11 (ref 6–20)
CALCIUM SERPL-MCNC: 8.1 MG/DL (ref 8.5–10.1)
CHLORIDE SERPL-SCNC: 108 MMOL/L (ref 98–107)
CO2 SERPL-SCNC: 28 MMOL/L (ref 21–32)
CREAT SERPL-MCNC: 0.93 MG/DL (ref 0.7–1.3)
DIFFERENTIAL METHOD BLD: ABNORMAL
EGFR (NO RACE VARIABLE) (RUSH/TITUS): 80 ML/MIN/1.73M2
EOSINOPHIL # BLD AUTO: 0.16 K/UL (ref 0–0.5)
EOSINOPHIL NFR BLD AUTO: 4.2 % (ref 1–4)
ERYTHROCYTE [DISTWIDTH] IN BLOOD BY AUTOMATED COUNT: 13 % (ref 11.5–14.5)
GLUCOSE SERPL-MCNC: 97 MG/DL (ref 74–106)
HCT VFR BLD AUTO: 27 % (ref 40–54)
HCT VFR BLD AUTO: 29.6 % (ref 40–54)
HCT VFR BLD AUTO: 32.2 % (ref 40–54)
HGB BLD-MCNC: 10.6 G/DL (ref 13.5–18)
HGB BLD-MCNC: 9 G/DL (ref 13.5–18)
HGB BLD-MCNC: 9.7 G/DL (ref 13.5–18)
IMM GRANULOCYTES # BLD AUTO: 0 K/UL (ref 0–0.04)
IMM GRANULOCYTES NFR BLD: 0 % (ref 0–0.4)
LYMPHOCYTES # BLD AUTO: 1.36 K/UL (ref 1–4.8)
LYMPHOCYTES NFR BLD AUTO: 35.9 % (ref 27–41)
MCH RBC QN AUTO: 32 PG (ref 27–31)
MCHC RBC AUTO-ENTMCNC: 33.3 G/DL (ref 32–36)
MCV RBC AUTO: 96.1 FL (ref 80–96)
MONOCYTES # BLD AUTO: 0.32 K/UL (ref 0–0.8)
MONOCYTES NFR BLD AUTO: 8.4 % (ref 2–6)
MPC BLD CALC-MCNC: 9.5 FL (ref 9.4–12.4)
NEUTROPHILS # BLD AUTO: 1.93 K/UL (ref 1.8–7.7)
NEUTROPHILS NFR BLD AUTO: 51 % (ref 53–65)
NRBC # BLD AUTO: 0 X10E3/UL
NRBC, AUTO (.00): 0 %
PLATELET # BLD AUTO: 157 K/UL (ref 150–400)
POTASSIUM SERPL-SCNC: 3.7 MMOL/L (ref 3.5–5.1)
RBC # BLD AUTO: 2.81 M/UL (ref 4.6–6.2)
SODIUM SERPL-SCNC: 138 MMOL/L (ref 136–145)
WBC # BLD AUTO: 3.79 K/UL (ref 4.5–11)

## 2024-09-27 PROCEDURE — 11000001 HC ACUTE MED/SURG PRIVATE ROOM

## 2024-09-27 PROCEDURE — 85025 COMPLETE CBC W/AUTO DIFF WBC: CPT | Performed by: GENERAL PRACTICE

## 2024-09-27 PROCEDURE — 63600175 PHARM REV CODE 636 W HCPCS: Performed by: GENERAL PRACTICE

## 2024-09-27 PROCEDURE — 85018 HEMOGLOBIN: CPT | Performed by: GENERAL PRACTICE

## 2024-09-27 PROCEDURE — 99232 SBSQ HOSP IP/OBS MODERATE 35: CPT | Mod: GC,,, | Performed by: FAMILY MEDICINE

## 2024-09-27 PROCEDURE — 80048 BASIC METABOLIC PNL TOTAL CA: CPT | Performed by: GENERAL PRACTICE

## 2024-09-27 PROCEDURE — 36415 COLL VENOUS BLD VENIPUNCTURE: CPT | Performed by: GENERAL PRACTICE

## 2024-09-27 PROCEDURE — 99232 SBSQ HOSP IP/OBS MODERATE 35: CPT | Mod: ,,, | Performed by: INTERNAL MEDICINE

## 2024-09-27 PROCEDURE — 25000003 PHARM REV CODE 250: Performed by: GENERAL PRACTICE

## 2024-09-27 RX ORDER — ONDANSETRON HYDROCHLORIDE 2 MG/ML
4 INJECTION, SOLUTION INTRAVENOUS EVERY 6 HOURS PRN
Status: DISCONTINUED | OUTPATIENT
Start: 2024-09-27 | End: 2024-09-30 | Stop reason: HOSPADM

## 2024-09-27 RX ADMIN — SODIUM CHLORIDE: 900 INJECTION, SOLUTION INTRAVENOUS at 11:09

## 2024-09-27 RX ADMIN — SODIUM CHLORIDE: 900 INJECTION, SOLUTION INTRAVENOUS at 10:09

## 2024-09-27 RX ADMIN — LOSARTAN POTASSIUM 50 MG: 50 TABLET, FILM COATED ORAL at 07:09

## 2024-09-27 RX ADMIN — METOPROLOL TARTRATE 12.5 MG: 25 TABLET, FILM COATED ORAL at 08:09

## 2024-09-27 RX ADMIN — ATORVASTATIN CALCIUM 20 MG: 10 TABLET, FILM COATED ORAL at 07:09

## 2024-09-27 RX ADMIN — SERTRALINE HYDROCHLORIDE 50 MG: 50 TABLET ORAL at 07:09

## 2024-09-27 RX ADMIN — PANTOPRAZOLE SODIUM 40 MG: 40 INJECTION, POWDER, LYOPHILIZED, FOR SOLUTION INTRAVENOUS at 08:09

## 2024-09-27 RX ADMIN — METOPROLOL TARTRATE 12.5 MG: 25 TABLET, FILM COATED ORAL at 07:09

## 2024-09-27 RX ADMIN — PANTOPRAZOLE SODIUM 40 MG: 40 INJECTION, POWDER, LYOPHILIZED, FOR SOLUTION INTRAVENOUS at 07:09

## 2024-09-27 NOTE — PLAN OF CARE
Problem: Gastrointestinal Bleeding  Goal: Optimal Coping with Acute Illness  Outcome: Progressing  Goal: Hemostasis  Outcome: Progressing     Problem: Adult Inpatient Plan of Care  Goal: Plan of Care Review  Outcome: Progressing  Goal: Patient-Specific Goal (Individualized)  Outcome: Progressing  Goal: Absence of Hospital-Acquired Illness or Injury  Outcome: Progressing  Goal: Optimal Comfort and Wellbeing  Outcome: Progressing  Goal: Readiness for Transition of Care  Outcome: Progressing     Problem: Fall Injury Risk  Goal: Absence of Fall and Fall-Related Injury  Outcome: Progressing

## 2024-09-27 NOTE — ASSESSMENT & PLAN NOTE
Patient has acute blood loss due to hemorrhage, the hemorrhage is due to gastrointestinal bleed, patient does have a propensity for bleeding due to a medication, the medication is Eliquis. Will trend hemoglobin/hematocrit Every 8 hours, as well as monitor and correct for any coagulation defects. CBC and vital signs have been reviewed and last CBC was noted-   Lab Results   Component Value Date    WBC 3.79 (L) 09/27/2024    HGB 9.7 (L) 09/27/2024    HCT 29.6 (L) 09/27/2024    MCV 96.1 (H) 09/27/2024     09/27/2024       Hold home Eliquis.  IV Protonix 40mg BID.  Will order a type and screen and consent patient for blood transfusion. Will transfuse if Hgb is <7g/dl (<8g/dl in cases of active ACS) or if patient has rapid bleeding leading to hemodynamic instability.  GI consult, recommendations appreciated.    9/26  Per GI: Continue holding Eliquis; Continue with clear liquid diet; Consider colonoscopy once Eliquis held x 3 days   -Monitor H/H daily    9/27  Pt still endorses bloody stool.  Eliquis still held  H/H worsened but still stable  Monitor H/H daily

## 2024-09-27 NOTE — PROGRESS NOTES
[facility] Medicine  Progress Note    Patient Name: Roberth Alberto  MRN: [id number]  Patient Class: IP- Inpatient   Admission Date: 9/25/2024  Length of Stay: 2 days  Attending Physician: Michael Peñaloza Jr., MD  Primary Care Provider: Yessy Pruitt MD (Inactive)        Subjective:     Principal Problem:Gastrointestinal hemorrhage        HPI:  Patient is a 82 y/o male with PMHx of Afib on Eliquis, HTN, anxiety, GERD s/p esophageal dilation who presents to the ED with complaint of blood per rectum. Patient reports having dark blood per rectum with bowel movements since yesterday morning. He reports having GI bleed x3 in the past that required blood transfusion, last GI bleed was last year. He had esophageal dilation one month ago in [place] where he spends time with his significant other. Patient follows with cardiology, Dr. Kam, at [facility]. Patient denies smoking or heavy alcohol use. He lives alone. Patient denies fever, chills, nausea, vomiting, SOB, chest pain or urinary habit changes.    In the ED vital signs showed /78, HR 78, RR 18, SpO2 97% and afebrile. Blood work H/H 11/33, WBC 3.48, . No electrolyte abnormalities. BUN/Cr 19/1.13, glucose 110. FOBT positive. Patient will be admitted to the hospital for further management.    Overview/Hospital Course:  No notes on file    Interval History: Pt still endorses blood in stool. No associated dizziness or fatigue.    Review of Systems   Constitutional:  Negative for activity change, appetite change, chills and fever.   HENT:  Negative for sore throat and trouble swallowing.    Respiratory:  Negative for apnea, cough and shortness of breath.    Cardiovascular:  Negative for chest pain.   Gastrointestinal:  Positive for anal bleeding and blood in stool. Negative for abdominal distention, abdominal pain, constipation, diarrhea, nausea, rectal pain and vomiting.   Genitourinary:  Negative for dysuria, frequency,  hematuria and urgency.   Musculoskeletal:  Negative for arthralgias and myalgias.   Skin:  Negative for color change.   Neurological:  Negative for weakness and headaches.   Psychiatric/Behavioral:  Negative for agitation, behavioral problems and confusion.      Objective:     Vital Signs (Most Recent):  Temp: 97.9 °F (36.6 °C) (09/27/24 1604)  Pulse: 71 (09/27/24 1604)  Resp: 16 (09/27/24 1604)  BP: (!) 156/76 (09/27/24 1604)  SpO2: 98 % (09/27/24 1604) Vital Signs (24h Range):  Temp:  [97.5 °F (36.4 °C)-98.2 °F (36.8 °C)] 97.9 °F (36.6 °C)  Pulse:  [67-82] 71  Resp:  [14-17] 16  SpO2:  [97 %-99 %] 98 %  BP: (121-156)/(54-79) 156/76     Weight: 84.8 kg (186 lb 15.2 oz)  Body mass index is 28.43 kg/m².    Intake/Output Summary (Last 24 hours) at 9/27/2024 1751  Last data filed at 9/27/2024 0638  Gross per 24 hour   Intake --   Output 1400 ml   Net -1400 ml         Physical Exam  Vitals and nursing note reviewed.   Constitutional:       General: He is not in acute distress.     Appearance: Normal appearance. He is normal weight. He is not ill-appearing, toxic-appearing or diaphoretic.   HENT:      Head: Normocephalic.      Nose: Nose normal. No congestion or rhinorrhea.      Mouth/Throat:      Mouth: Mucous membranes are moist.      Pharynx: Oropharynx is clear. No oropharyngeal exudate or posterior oropharyngeal erythema.   Eyes:      General: No scleral icterus.  Cardiovascular:      Rate and Rhythm: Normal rate and regular rhythm.   Pulmonary:      Effort: Pulmonary effort is normal.      Breath sounds: No wheezing, rhonchi or rales.   Abdominal:      General: Abdomen is flat. Bowel sounds are normal. There is no distension.      Palpations: Abdomen is soft.      Tenderness: There is no abdominal tenderness.   Musculoskeletal:         General: Normal range of motion.      Cervical back: Normal range of motion.      Right lower leg: No edema.      Left lower leg: No edema.   Skin:     General: Skin is warm and dry.       Coloration: Skin is not jaundiced.   Neurological:      Mental Status: He is alert and oriented to person, place, and time. Mental status is at baseline.   Psychiatric:         Mood and Affect: Mood normal.         Behavior: Behavior normal.         Thought Content: Thought content normal.         Judgment: Judgment normal.             Significant Labs: All pertinent labs within the past 24 hours have been reviewed.  Recent Lab Results         09/27/24  0824   09/27/24  0506   09/26/24 2017        Anion Gap   6         Baso #   0.02         Basophil %   0.5         BUN   10         BUN/CREAT RATIO   11         Calcium   8.1         Chloride   108         CO2   28         Creatinine   0.93         Differential Method   Auto         eGFR   80         Eos #   0.16         Eos %   4.2         Glucose   97         Hematocrit 29.6   27.0   26.8       Hemoglobin 9.7   9.0   8.9       Immature Grans (Abs)   0.00         Immature Granulocytes   0.0         Lymph #   1.36         Lymph %   35.9         MCH   32.0         MCHC   33.3         MCV   96.1         Mono #   0.32         Mono %   8.4         MPV   9.5         Neutrophils, Abs   1.93         Neutrophils Relative   51.0         nRBC   0.0         NUCLEATED RBC ABSOLUTE   0.00         Platelet Count   157         Potassium   3.7         RBC   2.81         RDW   13.0         Sodium   138         WBC   3.79                 Significant Imaging: I have reviewed all pertinent imaging results/findings within the past 24 hours.  I have reviewed and interpreted all pertinent imaging results/findings within the past 24 hours.    Assessment/Plan:      * Gastrointestinal hemorrhage  Patient has acute blood loss due to hemorrhage, the hemorrhage is due to gastrointestinal bleed, patient does have a propensity for bleeding due to a medication, the medication is Eliquis. Will trend hemoglobin/hematocrit Every 8 hours, as well as monitor and correct for any coagulation defects.  CBC and vital signs have been reviewed and last CBC was noted-   Lab Results   Component Value Date    WBC 3.79 (L) 09/27/2024    HGB 9.7 (L) 09/27/2024    HCT 29.6 (L) 09/27/2024    MCV 96.1 (H) 09/27/2024     09/27/2024       Hold home Eliquis.  IV Protonix 40mg BID.  Will order a type and screen and consent patient for blood transfusion. Will transfuse if Hgb is <7g/dl (<8g/dl in cases of active ACS) or if patient has rapid bleeding leading to hemodynamic instability.  GI consult, recommendations appreciated.    9/26  Per GI: Continue holding Eliquis; Continue with clear liquid diet; Consider colonoscopy once Eliquis held x 3 days   -Monitor H/H daily    9/27  Pt still endorses bloody stool.  Eliquis still held  H/H worsened but still stable  Monitor H/H daily    Anxiety  Continue home sertraline.      Atrial fibrillation  Patient with Long standing persistent (>12 months) atrial fibrillation which is controlled currently with Beta Blocker. Patient is currently in sinus rhythm.[id number] Score: 2. HASBLED Score: 2. Holding home Eliquis due to acute GI bleeding.    Primary hypertension  Chronic, controlled. Latest blood pressure and vitals reviewed-     Temp:  [97.5 °F (36.4 °C)-98.2 °F (36.8 °C)]   Pulse:  [67-82]   Resp:  [14-17]   BP: (121-156)/(54-79)   SpO2:  [97 %-99 %] .   Home meds for hypertension were reviewed and noted below.   Hypertension Medications               losartan (COZAAR) 50 MG tablet Take 50 mg by mouth once daily.  TAKE ONE TABLET BY MOUTH DAILY FOR BLOOD PRESSURE STOP HCTZ FOR BLOOD PRESSURE    metoprolol tartrate (LOPRESSOR) 25 MG tablet Take 12.5 mg by mouth 2 (two) times daily.  TAKE ONE-HALF TABLET BY MOUTH 2 TIMES A DAY            While in the hospital, will manage blood pressure as follows; Continue home antihypertensive regimen    Will utilize p.r.n. blood pressure medication only if patient's blood pressure greater than 180/110 and he develops symptoms such as worsening  chest pain or shortness of breath.      VTE Risk Mitigation (From admission, onward)           Ordered     Reason for No Pharmacological VTE Prophylaxis  Once        Question:  Reasons:  Answer:  Active Bleeding    09/25/24 1331     IP VTE HIGH RISK PATIENT  Once         09/25/24 1331     Place sequential compression device  Until discontinued         09/25/24 1331                    Discharge Planning   MAKAYLA:      Code Status: Full Code   Is the patient medically ready for discharge?:     Reason for patient still in hospital (select all that apply): Treatment  Discharge Plan A: Home                  Christopher Henry Jr., MD  Department of Hospital Medicine   [facility]

## 2024-09-27 NOTE — ASSESSMENT & PLAN NOTE
Patient with Long standing persistent (>12 months) atrial fibrillation which is controlled currently with Beta Blocker. Patient is currently in sinus rhythm.[id number] Score: 2. HASBLED Score: 2. Holding home Eliquis due to acute GI bleeding.

## 2024-09-27 NOTE — ASSESSMENT & PLAN NOTE
09/27/2024-H&H stable.  Patient with darker stool reported as above.  Eliquis remains on hold.  Tolerating diet.  We will review case with Dr. Junior with plan an addendum to follow regarding colonoscopy.    9/26/24-continued rectal bleeding reported. Labs noted and reviewed. Eliquis remains on hold. Continue with clear liquid diet. Consider colonoscopy once Eliquis held x 3 days. Continue with current treatment plan. Plan and addendum to follow by Dr Junior.     9/25/24-Admitted with two episodes of rectal bleed with this morning being the last. Labs reviewed. HH stable. Note prior history as above. Last dose of Eliquis today. Would recommend holding Eliquis. Monitor HH. PPI. May begin clear liquids. Will review case with Dr Junior with plan and addendum to follow.

## 2024-09-27 NOTE — NURSING
1412 Rec'd report from MANISH Hayden in ER.    1436 Rec'd patient to room. NADN. Patient voiced no concerns at this time. Call bell in reach. Instructed pt to use the call bell if he needs anything or needs to get up.    Depression and Chronic Disease: Care Instructions Your Care Instructions A chronic disease is one that you have for a long time. Some chronic diseases can be controlled, but they usually cannot be cured. Depression is common in people with chronic diseases, but it often goes unnoticed. Many people have concerns about seeking treatment for a mental health problem. You may think it's a sign of weakness, or you don't want people to know about it. It's important to overcome these reasons for not seeking treatment. Treating depression or anxiety is good for your health. Follow-up care is a key part of your treatment and safety. Be sure to make and go to all appointments, and call your doctor if you are having problems. It's also a good idea to know your test results and keep a list of the medicines you take. How can you care for yourself at home? Watch for symptoms of depression The symptoms of depression are often subtle at first. You may think they are caused by your disease rather than depression. Or you may think it is normal to be depressed when you have a chronic disease. If you are depressed you may: · Feel sad or hopeless. · Feel guilty or worthless. · Not enjoy the things you used to enjoy. · Feel hopeless, as though life is not worth living. · Have trouble thinking or remembering. · Have low energy, and you may not eat or sleep well. · Pull away from others. · Think often about death or killing yourself. (Keep the numbers for these national suicide hotlines: 6-247-495-TALK [1-970.224.9283] and 3-207-NSTHSNC [1-584.673.3131]. ) Get treatment By treating your depression, you can feel more hopeful and have more energy. If you feel better, you may take better care of yourself, so your health may improve. · Talk to your doctor if you have any changes in mood during treatment for your disease. · Ask your doctor for help.  Counseling, antidepressant medicine, or a combination of the two can help most people with depression. Often a combination works best. Counseling can also help you cope with having a chronic disease. When should you call for help? Call 911 anytime you think you may need emergency care. For example, call if: 
  · You feel like hurting yourself or someone else.  
  · Someone you know has depression and is about to attempt or is attempting suicide.  
[facility] your doctor now or seek immediate medical care if: 
  · You hear voices.  
  · Someone you know has depression and: 
? Starts to give away his or her possessions. ? Uses illegal drugs or drinks alcohol heavily. ? Talks or writes about death, including writing suicide notes or talking about guns, knives, or pills. ? Starts to spend a lot of time alone. ? Acts very aggressively or suddenly appears calm.  
 Watch closely for changes in your health, and be sure to contact your doctor if: 
  · You do not get better as expected. Where can you learn more? Go to http://octavio-moraima.info/. Enter Y611 in the search box to learn more about \"Depression and Chronic Disease: Care Instructions. \" Current as of: May 28, 2019 Content Version: 12.2 © 1080-0005 [employer]. Care instructions adapted under license by [employer] (which disclaims liability or warranty for this information). If you have questions about a medical condition or this instruction, always ask your healthcare professional. [place] [address] any warranty or liability for your use of this information. Depression Treatment: Care Instructions Your Care Instructions Depression is a condition that affects the way you feel, think, and act. It causes symptoms such as low energy, loss of interest in daily activities, and sadness or grouchiness that goes on for a long time. Depression is very common and affects men and women of all ages. Depression is a medical illness caused by changes in the natural chemicals in your brain. It is not a character flaw, and it does not mean that you are a bad or weak person. It does not mean that you are going crazy. It is important to know that depression can be treated. Medicines, counseling, and self-care can all help. Many people do not get help because they are embarrassed or think that they will get over the depression on their own. But some people do not get better without treatment. Follow-up care is a key part of your treatment and safety. Be sure to make and go to all appointments, and call your doctor if you are having problems. It's also a good idea to know your test results and keep a list of the medicines you take. How can you care for yourself at home? Learn about antidepressant medicines Antidepressant medicines can improve or end the symptoms of depression. You may need to take the medicine for at least 6 months, and often longer. Keep taking your medicine even if you feel better. If you stop taking it too soon, your symptoms may come back or get worse. You may start to feel better within 1 to 3 weeks of taking antidepressant medicine. But it can take as many as 6 to 8 weeks to see more improvement. Talk to your doctor if you have problems with your medicine or if you do not notice any improvement after 3 weeks. Antidepressants can make you feel tired, dizzy, or nervous. Some people have dry mouth, constipation, headaches, sexual problems, an upset stomach, or diarrhea. Many of these side effects are mild and go away on their own after you take the medicine for a few weeks. Some may last longer. Talk to your doctor if side effects bother you too much. You might be able to try a different medicine. If you are pregnant or breastfeeding, talk to your doctor about what medicines you can take. Learn about counseling In many cases, counseling can work as well as medicines to treat mild to moderate depression. Counseling is done by licensed mental health providers, such as psychologists, social workers, and some types of nurses. It can be done in one-on-one sessions or in a group setting. Many people find group sessions helpful. Cognitive-behavioral therapy is a type of counseling. In this treatment therapy, you learn how to see and change unhelpful thinking styles that may be adding to your depression. Counseling and medicines often work well when used together. To manage depression · Be physically active. Getting 30 minutes of exercise each day is good for your body and your mind. Begin slowly if it is hard for you to get started. If you already exercise, keep it up. · Plan something pleasant for yourself every day. Include activities that you have enjoyed in the past. 
· Get enough sleep. Talk to your doctor if you have problems sleeping. · Eat a balanced diet. If you do not feel hungry, eat small snacks rather than large meals. · Do not drink alcohol, use illegal drugs, or take medicines that your doctor has not prescribed for you. They may interfere with your treatment. · Spend time with family and friends. It may help to speak openly about your depression with people you trust. 
· Take your medicines exactly as prescribed. Call your doctor if you think you are having a problem with your medicine. · Do not make major life decisions while you are depressed. Depression may change the way you think. You will be able to make better decisions after you feel better. · Think positively. Challenge negative thoughts with statements such as \"I am hopeful\"; \"Things will get better\"; and \"I can ask for the help I need. \" Write down these statements and read them often, even if you don't believe them yet. · Be patient with yourself. It took time for your depression to develop, and it will take time for your symptoms to improve. Do not take on too much or be too hard on yourself. · Learn all you can about depression from written and online materials. · Check out behavioral health classes to learn more about dealing with depression. · Keep the numbers for these national suicide hotlines: 4-652-106-TALK (8-776.112.5788) and 4-292-DRZHIPQ (9-800.120.7854). If you or someone you know talks about suicide or feeling hopeless, get help right away. When should you call for help? Call 911 anytime you think you may need emergency care. For example, call if: 
  · You feel you cannot stop from hurting yourself or someone else.  
[facility] your doctor now or seek immediate medical care if: 
  · You hear voices.  
  · You feel much more depressed.  
 Watch closely for changes in your health, and be sure to contact your doctor if: 
  · You are having problems with your depression medicine.  
  · You are not getting better as expected. Where can you learn more? Go to http://octavio-moraima.info/. Enter B728 in the search box to learn more about \"Depression Treatment: Care Instructions. \" Current as of: May 28, 2019 Content Version: 12.2 © 0349-6805 [employer]. Care instructions adapted under license by [employer] (which disclaims liability or warranty for this information). If you have questions about a medical condition or this instruction, always ask your healthcare professional. [address] any warranty or liability for your use of this information.

## 2024-09-27 NOTE — PROGRESS NOTES
[facility] - Emergency Department  Gastroenterology  Progress Note    Patient Name: Roberth Alberto  MRN: [id number]  Admission Date: 9/25/2024  Hospital Length of Stay: 2 days  Code Status: Full Code   Attending Provider: Michael Peñaloza Jr., MD  Consulting Provider: SHANIKA Cook  Primary Care Physician: Yessy Pruitt MD (Inactive)  Principal Problem: Gastrointestinal hemorrhage    Patient seen and examined.  Agree with findings as noted detailed below.  Has had a couple of dark colored stools today.  No bright red hematochezia.  Hemoglobin stable.  Patient states he is hungry.  Exam with soft nondistended/nontender abdomen.  Continue observation holding Eliquis.  We will give regular diet this afternoon.    09/27/2024-Patient is seen, resting in bed, awake and alert.  Patient family is at bedside.  Patient states that he has had a proximally 2-3 dark bowel movements since yesterday.  He states they are less in amount and definitely darker than initial presentation.  His labs were reviewed his H&H is 9/29.  Today will be day 3 of his Eliquis being held.  His abdomen is soft, nontender.  He does state that he is hungry.    Past Medical History:   Diagnosis Date    Hypertension        History reviewed. No pertinent surgical history.    Review of patient's allergies indicates:  No Known Allergies  Family History    None       Tobacco Use    Smoking status: Never    Smokeless tobacco: Never   Substance and Sexual Activity    Alcohol use: Never    Drug use: Never    Sexual activity: Not on file     Review of Systems   Constitutional:  Negative for activity change, appetite change, chills and fever.   HENT:  Negative for sore throat and trouble swallowing.    Respiratory:  Negative for apnea, cough and shortness of breath.    Cardiovascular:  Negative for chest pain.   Gastrointestinal:  Positive for anal bleeding and blood in stool. Negative for abdominal distention, abdominal pain,  constipation, diarrhea, nausea, rectal pain and vomiting.   Genitourinary:  Negative for dysuria, frequency, hematuria and urgency.   Musculoskeletal:  Negative for arthralgias and myalgias.   Skin:  Negative for color change.   Neurological:  Negative for weakness and headaches.   Psychiatric/Behavioral:  Negative for agitation, behavioral problems and confusion.      Objective:     Vital Signs (Most Recent):  Temp: 98.2 °F (36.8 °C) (09/27/24 0704)  Pulse: 74 (09/27/24 0735)  Resp: 17 (09/27/24 0735)  BP: (!) 151/70 (09/27/24 0735)  SpO2: 98 % (09/27/24 0735) Vital Signs (24h Range):  Temp:  [97.5 °F (36.4 °C)-98.2 °F (36.8 °C)] 98.2 °F (36.8 °C)  Pulse:  [68-86] 74  Resp:  [14-17] 17  SpO2:  [97 %-99 %] 98 %  BP: (121-151)/(54-71) 151/70     Weight: 84.8 kg (186 lb 15.2 oz) (09/27/24 0035)  Body mass index is 28.43 kg/m².      Intake/Output Summary (Last 24 hours) at 9/27/2024 1217  Last data filed at 9/27/2024 0638  Gross per 24 hour   Intake --   Output 1400 ml   Net -1400 ml       Lines/Drains/Airways       Peripheral Intravenous Line  Duration                  Peripheral IV - Single Lumen 09/25/24 1016 20 G Anterior;Distal;Right Upper Arm 2 days                     Physical Exam  Vitals and nursing note reviewed.   Constitutional:       General: He is not in acute distress.     Appearance: Normal appearance. He is normal weight. He is not ill-appearing, toxic-appearing or diaphoretic.   HENT:      Head: Normocephalic.      Nose: Nose normal. No congestion or rhinorrhea.      Mouth/Throat:      Mouth: Mucous membranes are moist.      Pharynx: Oropharynx is clear. No oropharyngeal exudate or posterior oropharyngeal erythema.   Eyes:      General: No scleral icterus.  Cardiovascular:      Rate and Rhythm: Normal rate and regular rhythm.   Pulmonary:      Effort: Pulmonary effort is normal.      Breath sounds: No wheezing, rhonchi or rales.   Abdominal:      General: Abdomen is flat. Bowel sounds are normal.  There is no distension.      Palpations: Abdomen is soft.      Tenderness: There is no abdominal tenderness.   Musculoskeletal:         General: Normal range of motion.      Cervical back: Normal range of motion.      Right lower leg: No edema.      Left lower leg: No edema.   Skin:     General: Skin is warm and dry.      Coloration: Skin is not jaundiced.   Neurological:      General: No focal deficit present.      Mental Status: He is alert and oriented to person, place, and time. Mental status is at baseline.      Motor: No weakness.   Psychiatric:         Mood and Affect: Mood normal.         Behavior: Behavior normal.         Thought Content: Thought content normal.         Judgment: Judgment normal.          Significant Labs:  All pertinent lab results from the last 24 hours have been reviewed.    Significant Imaging:  Imaging results within the past 24 hours have been reviewed.  Assessment/Plan:     GI  * Gastrointestinal hemorrhage  09/27/2024-H&H stable.  Patient with darker stool reported as above.  Eliquis remains on hold.  Tolerating diet.  We will review case with Dr. Junior with plan an addendum to follow regarding colonoscopy.    9/26/24-continued rectal bleeding reported. Labs noted and reviewed. Eliquis remains on hold. Continue with clear liquid diet. Consider colonoscopy once Eliquis held x 3 days. Continue with current treatment plan. Plan and addendum to follow by Dr Junior.     9/25/24-Admitted with two episodes of rectal bleed with this morning being the last. Labs reviewed. HH stable. Note prior history as above. Last dose of Eliquis today. Would recommend holding Eliquis. Monitor HH. PPI. May begin clear liquids. Will review case with Dr Junior with plan and addendum to follow.         Thank you for your consult. I will follow-up with patient. Please contact us if you have any additional questions.    SHANIKA Cook  Gastroenterology  [facility] - Emergency Department

## 2024-09-27 NOTE — SUBJECTIVE & OBJECTIVE
Past Medical History:   Diagnosis Date    Hypertension        History reviewed. No pertinent surgical history.    Review of patient's allergies indicates:  No Known Allergies  Family History    None       Tobacco Use    Smoking status: Never    Smokeless tobacco: Never   Substance and Sexual Activity    Alcohol use: Never    Drug use: Never    Sexual activity: Not on file     Review of Systems   Constitutional:  Negative for activity change, appetite change, chills and fever.   HENT:  Negative for sore throat and trouble swallowing.    Respiratory:  Negative for apnea, cough and shortness of breath.    Cardiovascular:  Negative for chest pain.   Gastrointestinal:  Positive for anal bleeding and blood in stool. Negative for abdominal distention, abdominal pain, constipation, diarrhea, nausea, rectal pain and vomiting.   Genitourinary:  Negative for dysuria, frequency, hematuria and urgency.   Musculoskeletal:  Negative for arthralgias and myalgias.   Skin:  Negative for color change.   Neurological:  Negative for weakness and headaches.   Psychiatric/Behavioral:  Negative for agitation, behavioral problems and confusion.      Objective:     Vital Signs (Most Recent):  Temp: 98.2 °F (36.8 °C) (09/27/24 0704)  Pulse: 74 (09/27/24 0735)  Resp: 17 (09/27/24 0735)  BP: (!) 151/70 (09/27/24 0735)  SpO2: 98 % (09/27/24 0735) Vital Signs (24h Range):  Temp:  [97.5 °F (36.4 °C)-98.2 °F (36.8 °C)] 98.2 °F (36.8 °C)  Pulse:  [68-86] 74  Resp:  [14-17] 17  SpO2:  [97 %-99 %] 98 %  BP: (121-151)/(54-71) 151/70     Weight: 84.8 kg (186 lb 15.2 oz) (09/27/24 0035)  Body mass index is 28.43 kg/m².      Intake/Output Summary (Last 24 hours) at 9/27/2024 1217  Last data filed at 9/27/2024 0681  Gross per 24 hour   Intake --   Output 1400 ml   Net -1400 ml       Lines/Drains/Airways       Peripheral Intravenous Line  Duration                  Peripheral IV - Single Lumen 09/25/24 1016 20 G Anterior;Distal;Right Upper Arm 2 days                      Physical Exam  Vitals and nursing note reviewed.   Constitutional:       General: He is not in acute distress.     Appearance: Normal appearance. He is normal weight. He is not ill-appearing, toxic-appearing or diaphoretic.   HENT:      Head: Normocephalic.      Nose: Nose normal. No congestion or rhinorrhea.      Mouth/Throat:      Mouth: Mucous membranes are moist.      Pharynx: Oropharynx is clear. No oropharyngeal exudate or posterior oropharyngeal erythema.   Eyes:      General: No scleral icterus.  Cardiovascular:      Rate and Rhythm: Normal rate and regular rhythm.   Pulmonary:      Effort: Pulmonary effort is normal.      Breath sounds: No wheezing, rhonchi or rales.   Abdominal:      General: Abdomen is flat. Bowel sounds are normal. There is no distension.      Palpations: Abdomen is soft.      Tenderness: There is no abdominal tenderness.   Musculoskeletal:         General: Normal range of motion.      Cervical back: Normal range of motion.      Right lower leg: No edema.      Left lower leg: No edema.   Skin:     General: Skin is warm and dry.      Coloration: Skin is not jaundiced.   Neurological:      General: No focal deficit present.      Mental Status: He is alert and oriented to person, place, and time. Mental status is at baseline.      Motor: No weakness.   Psychiatric:         Mood and Affect: Mood normal.         Behavior: Behavior normal.         Thought Content: Thought content normal.         Judgment: Judgment normal.          Significant Labs:  All pertinent lab results from the last 24 hours have been reviewed.    Significant Imaging:  Imaging results within the past 24 hours have been reviewed.

## 2024-09-27 NOTE — ASSESSMENT & PLAN NOTE
Chronic, controlled. Latest blood pressure and vitals reviewed-     Temp:  [97.5 °F (36.4 °C)-98.2 °F (36.8 °C)]   Pulse:  [67-82]   Resp:  [14-17]   BP: (121-156)/(54-79)   SpO2:  [97 %-99 %] .   Home meds for hypertension were reviewed and noted below.   Hypertension Medications               losartan (COZAAR) 50 MG tablet Take 50 mg by mouth once daily.  TAKE ONE TABLET BY MOUTH DAILY FOR BLOOD PRESSURE STOP HCTZ FOR BLOOD PRESSURE    metoprolol tartrate (LOPRESSOR) 25 MG tablet Take 12.5 mg by mouth 2 (two) times daily.  TAKE ONE-HALF TABLET BY MOUTH 2 TIMES A DAY            While in the hospital, will manage blood pressure as follows; Continue home antihypertensive regimen    Will utilize p.r.n. blood pressure medication only if patient's blood pressure greater than 180/110 and he develops symptoms such as worsening chest pain or shortness of breath.

## 2024-09-27 NOTE — SUBJECTIVE & OBJECTIVE
Interval History: Pt still endorses blood in stool. No associated dizziness or fatigue.    Review of Systems   Constitutional:  Negative for activity change, appetite change, chills and fever.   HENT:  Negative for sore throat and trouble swallowing.    Respiratory:  Negative for apnea, cough and shortness of breath.    Cardiovascular:  Negative for chest pain.   Gastrointestinal:  Positive for anal bleeding and blood in stool. Negative for abdominal distention, abdominal pain, constipation, diarrhea, nausea, rectal pain and vomiting.   Genitourinary:  Negative for dysuria, frequency, hematuria and urgency.   Musculoskeletal:  Negative for arthralgias and myalgias.   Skin:  Negative for color change.   Neurological:  Negative for weakness and headaches.   Psychiatric/Behavioral:  Negative for agitation, behavioral problems and confusion.      Objective:     Vital Signs (Most Recent):  Temp: 97.9 °F (36.6 °C) (09/27/24 1604)  Pulse: 71 (09/27/24 1604)  Resp: 16 (09/27/24 1604)  BP: (!) 156/76 (09/27/24 1604)  SpO2: 98 % (09/27/24 1604) Vital Signs (24h Range):  Temp:  [97.5 °F (36.4 °C)-98.2 °F (36.8 °C)] 97.9 °F (36.6 °C)  Pulse:  [67-82] 71  Resp:  [14-17] 16  SpO2:  [97 %-99 %] 98 %  BP: (121-156)/(54-79) 156/76     Weight: 84.8 kg (186 lb 15.2 oz)  Body mass index is 28.43 kg/m².    Intake/Output Summary (Last 24 hours) at 9/27/2024 1759  Last data filed at 9/27/2024 0638  Gross per 24 hour   Intake --   Output 1400 ml   Net -1400 ml         Physical Exam  Vitals and nursing note reviewed.   Constitutional:       General: He is not in acute distress.     Appearance: Normal appearance. He is normal weight. He is not ill-appearing, toxic-appearing or diaphoretic.   HENT:      Head: Normocephalic.      Nose: Nose normal. No congestion or rhinorrhea.      Mouth/Throat:      Mouth: Mucous membranes are moist.      Pharynx: Oropharynx is clear. No oropharyngeal exudate or posterior oropharyngeal erythema.   Eyes:       General: No scleral icterus.  Cardiovascular:      Rate and Rhythm: Normal rate and regular rhythm.   Pulmonary:      Effort: Pulmonary effort is normal.      Breath sounds: No wheezing, rhonchi or rales.   Abdominal:      General: Abdomen is flat. Bowel sounds are normal. There is no distension.      Palpations: Abdomen is soft.      Tenderness: There is no abdominal tenderness.   Musculoskeletal:         General: Normal range of motion.      Cervical back: Normal range of motion.      Right lower leg: No edema.      Left lower leg: No edema.   Skin:     General: Skin is warm and dry.      Coloration: Skin is not jaundiced.   Neurological:      Mental Status: He is alert and oriented to person, place, and time. Mental status is at baseline.   Psychiatric:         Mood and Affect: Mood normal.         Behavior: Behavior normal.         Thought Content: Thought content normal.         Judgment: Judgment normal.             Significant Labs: All pertinent labs within the past 24 hours have been reviewed.  Recent Lab Results         09/27/24  0824   09/27/24  0506   09/26/24 2017        Anion Gap   6         Baso #   0.02         Basophil %   0.5         BUN   10         BUN/CREAT RATIO   11         Calcium   8.1         Chloride   108         CO2   28         Creatinine   0.93         Differential Method   Auto         eGFR   80         Eos #   0.16         Eos %   4.2         Glucose   97         Hematocrit 29.6   27.0   26.8       Hemoglobin 9.7   9.0   8.9       Immature Grans (Abs)   0.00         Immature Granulocytes   0.0         Lymph #   1.36         Lymph %   35.9         MCH   32.0         MCHC   33.3         MCV   96.1         Mono #   0.32         Mono %   8.4         MPV   9.5         Neutrophils, Abs   1.93         Neutrophils Relative   51.0         nRBC   0.0         NUCLEATED RBC ABSOLUTE   0.00         Platelet Count   157         Potassium   3.7         RBC   2.81         RDW   13.0         Sodium    138         WBC   3.79                 Significant Imaging: I have reviewed all pertinent imaging results/findings within the past 24 hours.  I have reviewed and interpreted all pertinent imaging results/findings within the past 24 hours.

## 2024-09-27 NOTE — PLAN OF CARE
Eliquis being held for colonoscopy.  CM will continue to follow for discharge needs as they arise.

## 2024-09-28 LAB
ANION GAP SERPL CALCULATED.3IONS-SCNC: 9 MMOL/L (ref 7–16)
BASOPHILS # BLD AUTO: 0.02 K/UL (ref 0–0.2)
BASOPHILS NFR BLD AUTO: 0.6 % (ref 0–1)
BUN SERPL-MCNC: 8 MG/DL (ref 7–18)
BUN/CREAT SERPL: 8 (ref 6–20)
CALCIUM SERPL-MCNC: 8.4 MG/DL (ref 8.5–10.1)
CHLORIDE SERPL-SCNC: 106 MMOL/L (ref 98–107)
CO2 SERPL-SCNC: 28 MMOL/L (ref 21–32)
CREAT SERPL-MCNC: 0.98 MG/DL (ref 0.7–1.3)
DIFFERENTIAL METHOD BLD: ABNORMAL
EGFR (NO RACE VARIABLE) (RUSH/TITUS): 76 ML/MIN/1.73M2
EOSINOPHIL # BLD AUTO: 0.19 K/UL (ref 0–0.5)
EOSINOPHIL NFR BLD AUTO: 5.5 % (ref 1–4)
ERYTHROCYTE [DISTWIDTH] IN BLOOD BY AUTOMATED COUNT: 12.8 % (ref 11.5–14.5)
GLUCOSE SERPL-MCNC: 109 MG/DL (ref 74–106)
HCT VFR BLD AUTO: 28.2 % (ref 40–54)
HCT VFR BLD AUTO: 30.5 % (ref 40–54)
HGB BLD-MCNC: 10.1 G/DL (ref 13.5–18)
HGB BLD-MCNC: 9.1 G/DL (ref 13.5–18)
IMM GRANULOCYTES # BLD AUTO: 0.01 K/UL (ref 0–0.04)
IMM GRANULOCYTES NFR BLD: 0.3 % (ref 0–0.4)
LYMPHOCYTES # BLD AUTO: 0.97 K/UL (ref 1–4.8)
LYMPHOCYTES NFR BLD AUTO: 28.3 % (ref 27–41)
MCH RBC QN AUTO: 31.1 PG (ref 27–31)
MCHC RBC AUTO-ENTMCNC: 32.3 G/DL (ref 32–36)
MCV RBC AUTO: 96.2 FL (ref 80–96)
MONOCYTES # BLD AUTO: 0.3 K/UL (ref 0–0.8)
MONOCYTES NFR BLD AUTO: 8.7 % (ref 2–6)
MPC BLD CALC-MCNC: 9.9 FL (ref 9.4–12.4)
NEUTROPHILS # BLD AUTO: 1.94 K/UL (ref 1.8–7.7)
NEUTROPHILS NFR BLD AUTO: 56.6 % (ref 53–65)
NRBC # BLD AUTO: 0 X10E3/UL
NRBC, AUTO (.00): 0 %
PLATELET # BLD AUTO: 149 K/UL (ref 150–400)
POTASSIUM SERPL-SCNC: 3.6 MMOL/L (ref 3.5–5.1)
RBC # BLD AUTO: 2.93 M/UL (ref 4.6–6.2)
SODIUM SERPL-SCNC: 139 MMOL/L (ref 136–145)
WBC # BLD AUTO: 3.43 K/UL (ref 4.5–11)

## 2024-09-28 PROCEDURE — 85018 HEMOGLOBIN: CPT | Performed by: GENERAL PRACTICE

## 2024-09-28 PROCEDURE — 36415 COLL VENOUS BLD VENIPUNCTURE: CPT | Performed by: GENERAL PRACTICE

## 2024-09-28 PROCEDURE — 25000003 PHARM REV CODE 250: Performed by: FAMILY MEDICINE

## 2024-09-28 PROCEDURE — 63600175 PHARM REV CODE 636 W HCPCS: Performed by: GENERAL PRACTICE

## 2024-09-28 PROCEDURE — 99232 SBSQ HOSP IP/OBS MODERATE 35: CPT | Mod: GC,,, | Performed by: FAMILY MEDICINE

## 2024-09-28 PROCEDURE — 11000001 HC ACUTE MED/SURG PRIVATE ROOM

## 2024-09-28 PROCEDURE — 85014 HEMATOCRIT: CPT | Performed by: GENERAL PRACTICE

## 2024-09-28 PROCEDURE — 85025 COMPLETE CBC W/AUTO DIFF WBC: CPT | Performed by: GENERAL PRACTICE

## 2024-09-28 PROCEDURE — 99232 SBSQ HOSP IP/OBS MODERATE 35: CPT | Mod: ,,, | Performed by: INTERNAL MEDICINE

## 2024-09-28 PROCEDURE — 80048 BASIC METABOLIC PNL TOTAL CA: CPT | Performed by: GENERAL PRACTICE

## 2024-09-28 PROCEDURE — 25000003 PHARM REV CODE 250: Performed by: GENERAL PRACTICE

## 2024-09-28 RX ORDER — ATORVASTATIN CALCIUM 20 MG/1
20 TABLET, FILM COATED ORAL NIGHTLY
Status: DISCONTINUED | OUTPATIENT
Start: 2024-09-28 | End: 2024-09-30 | Stop reason: HOSPADM

## 2024-09-28 RX ADMIN — PANTOPRAZOLE SODIUM 40 MG: 40 INJECTION, POWDER, LYOPHILIZED, FOR SOLUTION INTRAVENOUS at 09:09

## 2024-09-28 RX ADMIN — PANTOPRAZOLE SODIUM 40 MG: 40 INJECTION, POWDER, LYOPHILIZED, FOR SOLUTION INTRAVENOUS at 08:09

## 2024-09-28 RX ADMIN — METOPROLOL TARTRATE 12.5 MG: 25 TABLET, FILM COATED ORAL at 08:09

## 2024-09-28 RX ADMIN — METOPROLOL TARTRATE 12.5 MG: 25 TABLET, FILM COATED ORAL at 09:09

## 2024-09-28 RX ADMIN — LOSARTAN POTASSIUM 50 MG: 50 TABLET, FILM COATED ORAL at 09:09

## 2024-09-28 RX ADMIN — ATORVASTATIN CALCIUM 20 MG: 20 TABLET, FILM COATED ORAL at 08:09

## 2024-09-28 RX ADMIN — SERTRALINE HYDROCHLORIDE 50 MG: 50 TABLET ORAL at 09:09

## 2024-09-28 RX ADMIN — SODIUM CHLORIDE: 900 INJECTION, SOLUTION INTRAVENOUS at 08:09

## 2024-09-28 NOTE — PROGRESS NOTES
[facility]  Gastroenterology  Progress Note    Patient Name: Roberth Alberto  MRN: [id number]  Admission Date: 9/25/2024  Hospital Length of Stay: 3 days  Code Status: Full Code   Attending Provider: Micahel Peñaloza Jr., MD  Consulting Provider: LALO Junior MD  Primary Care Physician: Yessy Pruitt MD (Inactive)  Principal Problem: Gastrointestinal hemorrhage        Subjective:     Interval History: Still seeing some dark red blood in stool but small amount he says. Hgb stable    Review of Systems   Constitutional:  Negative for chills and fever.   HENT:  Negative for sore throat.    Respiratory:  Negative for cough and shortness of breath.    Cardiovascular:  Negative for chest pain.   Gastrointestinal:  Negative for abdominal pain, nausea and vomiting.   Genitourinary:  Negative for dysuria and flank pain.   Neurological:  Negative for syncope and weakness.   Psychiatric/Behavioral:  Negative for behavioral problems and confusion.      Objective:     Vital Signs (Most Recent):  Temp: 98.4 °F (36.9 °C) (09/28/24 1130)  Pulse: 65 (09/28/24 1130)  Resp: 14 (09/28/24 1130)  BP: 122/72 (09/28/24 1130)  SpO2: 97 % (09/28/24 1130) Vital Signs (24h Range):  Temp:  [97.6 °F (36.4 °C)-98.7 °F (37.1 °C)] 98.4 °F (36.9 °C)  Pulse:  [65-81] 65  Resp:  [14-18] 14  SpO2:  [97 %-99 %] 97 %  BP: (122-180)/(68-82) 122/72     Weight: 84.8 kg (186 lb 15.2 oz) (09/27/24 0035)  Body mass index is 28.43 kg/m².    No intake or output data in the 24 hours ending 09/28/24 1547    Lines/Drains/Airways       Peripheral Intravenous Line  Duration                  Peripheral IV - Single Lumen 09/25/24 1016 20 G Anterior;Distal;Right Upper Arm 3 days                     Physical Exam  Vitals and nursing note reviewed. Exam conducted with a chaperone present.   Constitutional:       General: He is not in acute distress.     Appearance: He is not toxic-appearing.   HENT:      Head: Normocephalic and atraumatic.       Mouth/Throat:      Mouth: Mucous membranes are moist.   Eyes:      General: No scleral icterus.  Cardiovascular:      Rate and Rhythm: Normal rate and regular rhythm.      Pulses: Normal pulses.      Heart sounds: Normal heart sounds.   Pulmonary:      Effort: Pulmonary effort is normal. No respiratory distress.      Breath sounds: Normal breath sounds.   Abdominal:      General: Bowel sounds are normal. There is no distension.      Palpations: Abdomen is soft.      Tenderness: There is no abdominal tenderness.   Skin:     General: Skin is warm and dry.   Neurological:      General: No focal deficit present.      Mental Status: He is alert and oriented to person, place, and time.      Cranial Nerves: No cranial nerve deficit.      Sensory: No sensory deficit.          Significant Labs:  Recent Lab Results         09/28/24  0816   09/28/24  0352   09/27/24 1952        Anion Gap   9         Baso #   0.02         Basophil %   0.6         BUN   8         BUN/CREAT RATIO   8         Calcium   8.4         Chloride   106         CO2   28         Creatinine   0.98         Differential Method   Auto         eGFR   76         Eos #   0.19         Eos %   5.5         Glucose   109         Hematocrit 30.5   28.2   32.2       Hemoglobin 10.1   9.1   10.6       Immature Grans (Abs)   0.01         Immature Granulocytes   0.3         Lymph #   0.97         Lymph %   28.3         MCH   31.1         MCHC   32.3         MCV   96.2         Mono #   0.30         Mono %   8.7         MPV   9.9         Neutrophils, Abs   1.94         Neutrophils Relative   56.6         nRBC   0.0         NUCLEATED RBC ABSOLUTE   0.00         Platelet Count   149         Potassium   3.6         RBC   2.93         RDW   12.8         Sodium   139         WBC   3.43                   Significant Imaging:  Imaging results within the past 24 hours have been reviewed.  Assessment/Plan:     GI  * Gastrointestinal hemorrhage  9/28/2024- States seeing some  dark red blood in stool but feels is small amount.Hgb 10.1 stable.Tolerating regular diet. Plan change to clear liquid diet in AM and prep for colonoscopy Monday.    09/27/2024-H&H stable.  Patient with darker stool reported as above.  Eliquis remains on hold.  Tolerating diet.  We will review case with Dr. Junior with plan an addendum to follow regarding colonoscopy.    9/26/24-continued rectal bleeding reported. Labs noted and reviewed. Eliquis remains on hold. Continue with clear liquid diet. Consider colonoscopy once Eliquis held x 3 days. Continue with current treatment plan. Plan and addendum to follow by Dr Junior.     9/25/24-Admitted with two episodes of rectal bleed with this morning being the last. Labs reviewed. HH stable. Note prior history as above. Last dose of Eliquis today. Would recommend holding Eliquis. Monitor HH. PPI. May begin clear liquids. Will review case with Dr Junior with plan and addendum to follow.         Thank you for your consult. I will follow-up with patient. Please contact us if you have any additional questions.    LALO Junior MD  Gastroenterology  [facility]

## 2024-09-28 NOTE — SUBJECTIVE & OBJECTIVE
Subjective:     Interval History: Still seeing some dark red blood in stool but small amount he says. Hgb stable    Review of Systems   Constitutional:  Negative for chills and fever.   HENT:  Negative for sore throat.    Respiratory:  Negative for cough and shortness of breath.    Cardiovascular:  Negative for chest pain.   Gastrointestinal:  Negative for abdominal pain, nausea and vomiting.   Genitourinary:  Negative for dysuria and flank pain.   Neurological:  Negative for syncope and weakness.   Psychiatric/Behavioral:  Negative for behavioral problems and confusion.      Objective:     Vital Signs (Most Recent):  Temp: 98.4 °F (36.9 °C) (09/28/24 1130)  Pulse: 65 (09/28/24 1130)  Resp: 14 (09/28/24 1130)  BP: 122/72 (09/28/24 1130)  SpO2: 97 % (09/28/24 1130) Vital Signs (24h Range):  Temp:  [97.6 °F (36.4 °C)-98.7 °F (37.1 °C)] 98.4 °F (36.9 °C)  Pulse:  [65-81] 65  Resp:  [14-18] 14  SpO2:  [97 %-99 %] 97 %  BP: (122-180)/(68-82) 122/72     Weight: 84.8 kg (186 lb 15.2 oz) (09/27/24 0035)  Body mass index is 28.43 kg/m².    No intake or output data in the 24 hours ending 09/28/24 1547    Lines/Drains/Airways       Peripheral Intravenous Line  Duration                  Peripheral IV - Single Lumen 09/25/24 1016 20 G Anterior;Distal;Right Upper Arm 3 days                     Physical Exam  Vitals and nursing note reviewed. Exam conducted with a chaperone present.   Constitutional:       General: He is not in acute distress.     Appearance: He is not toxic-appearing.   HENT:      Head: Normocephalic and atraumatic.      Mouth/Throat:      Mouth: Mucous membranes are moist.   Eyes:      General: No scleral icterus.  Cardiovascular:      Rate and Rhythm: Normal rate and regular rhythm.      Pulses: Normal pulses.      Heart sounds: Normal heart sounds.   Pulmonary:      Effort: Pulmonary effort is normal. No respiratory distress.      Breath sounds: Normal breath sounds.   Abdominal:      General: Bowel sounds  are normal. There is no distension.      Palpations: Abdomen is soft.      Tenderness: There is no abdominal tenderness.   Skin:     General: Skin is warm and dry.   Neurological:      General: No focal deficit present.      Mental Status: He is alert and oriented to person, place, and time.      Cranial Nerves: No cranial nerve deficit.      Sensory: No sensory deficit.          Significant Labs:  Recent Lab Results         09/28/24  0816   09/28/24  0352   09/27/24  1952        Anion Gap   9         Baso #   0.02         Basophil %   0.6         BUN   8         BUN/CREAT RATIO   8         Calcium   8.4         Chloride   106         CO2   28         Creatinine   0.98         Differential Method   Auto         eGFR   76         Eos #   0.19         Eos %   5.5         Glucose   109         Hematocrit 30.5   28.2   32.2       Hemoglobin 10.1   9.1   10.6       Immature Grans (Abs)   0.01         Immature Granulocytes   0.3         Lymph #   0.97         Lymph %   28.3         MCH   31.1         MCHC   32.3         MCV   96.2         Mono #   0.30         Mono %   8.7         MPV   9.9         Neutrophils, Abs   1.94         Neutrophils Relative   56.6         nRBC   0.0         NUCLEATED RBC ABSOLUTE   0.00         Platelet Count   149         Potassium   3.6         RBC   2.93         RDW   12.8         Sodium   139         WBC   3.43                   Significant Imaging:  Imaging results within the past 24 hours have been reviewed.

## 2024-09-28 NOTE — SUBJECTIVE & OBJECTIVE
Interval History: Pt still endorses dark blood per rectum. No associated dizziness or fatigue.    Review of Systems   Constitutional:  Negative for activity change, appetite change, chills and fever.   HENT:  Negative for sore throat and trouble swallowing.    Respiratory:  Negative for apnea, cough and shortness of breath.    Cardiovascular:  Negative for chest pain.   Gastrointestinal:  Positive for anal bleeding and blood in stool. Negative for abdominal distention, abdominal pain, constipation, diarrhea, nausea, rectal pain and vomiting.   Genitourinary:  Negative for dysuria, frequency, hematuria and urgency.   Musculoskeletal:  Negative for arthralgias and myalgias.   Skin:  Negative for color change.   Neurological:  Negative for weakness and headaches.   Psychiatric/Behavioral:  Negative for agitation, behavioral problems and confusion.      Objective:     Vital Signs (Most Recent):  Temp: 98.4 °F (36.9 °C) (09/28/24 1130)  Pulse: 65 (09/28/24 1130)  Resp: 14 (09/28/24 1130)  BP: 122/72 (09/28/24 1130)  SpO2: 97 % (09/28/24 1130) Vital Signs (24h Range):  Temp:  [97.6 °F (36.4 °C)-98.7 °F (37.1 °C)] 98.4 °F (36.9 °C)  Pulse:  [65-81] 65  Resp:  [14-18] 14  SpO2:  [97 %-99 %] 97 %  BP: (122-180)/(68-82) 122/72     Weight: 84.8 kg (186 lb 15.2 oz)  Body mass index is 28.43 kg/m².  No intake or output data in the 24 hours ending 09/28/24 1251      Physical Exam  Vitals and nursing note reviewed.   Constitutional:       General: He is not in acute distress.     Appearance: Normal appearance. He is normal weight. He is not ill-appearing, toxic-appearing or diaphoretic.   HENT:      Head: Normocephalic.      Nose: Nose normal. No congestion or rhinorrhea.      Mouth/Throat:      Mouth: Mucous membranes are moist.      Pharynx: Oropharynx is clear. No oropharyngeal exudate or posterior oropharyngeal erythema.   Eyes:      General: No scleral icterus.  Cardiovascular:      Rate and Rhythm: Normal rate and regular  rhythm.   Pulmonary:      Effort: Pulmonary effort is normal.      Breath sounds: No wheezing, rhonchi or rales.   Abdominal:      General: Abdomen is flat. Bowel sounds are normal. There is no distension.      Palpations: Abdomen is soft.      Tenderness: There is no abdominal tenderness.   Musculoskeletal:         General: Normal range of motion.      Cervical back: Normal range of motion.      Right lower leg: No edema.      Left lower leg: No edema.   Skin:     General: Skin is warm and dry.      Coloration: Skin is not jaundiced.   Neurological:      Mental Status: He is alert and oriented to person, place, and time. Mental status is at baseline.   Psychiatric:         Mood and Affect: Mood normal.         Behavior: Behavior normal.         Thought Content: Thought content normal.         Judgment: Judgment normal.             Significant Labs: All pertinent labs within the past 24 hours have been reviewed.  Recent Lab Results         09/28/24  0816   09/28/24  0352   09/27/24 1952        Anion Gap   9         Baso #   0.02         Basophil %   0.6         BUN   8         BUN/CREAT RATIO   8         Calcium   8.4         Chloride   106         CO2   28         Creatinine   0.98         Differential Method   Auto         eGFR   76         Eos #   0.19         Eos %   5.5         Glucose   109         Hematocrit 30.5   28.2   32.2       Hemoglobin 10.1   9.1   10.6       Immature Grans (Abs)   0.01         Immature Granulocytes   0.3         Lymph #   0.97         Lymph %   28.3         MCH   31.1         MCHC   32.3         MCV   96.2         Mono #   0.30         Mono %   8.7         MPV   9.9         Neutrophils, Abs   1.94         Neutrophils Relative   56.6         nRBC   0.0         NUCLEATED RBC ABSOLUTE   0.00         Platelet Count   149         Potassium   3.6         RBC   2.93         RDW   12.8         Sodium   139         WBC   3.43                 Significant Imaging: I have reviewed all pertinent  imaging results/findings within the past 24 hours.  I have reviewed and interpreted all pertinent imaging results/findings within the past 24 hours.

## 2024-09-28 NOTE — ASSESSMENT & PLAN NOTE
Patient with Long standing persistent (>12 months) atrial fibrillation which is controlled currently with Beta Blocker. Patient is currently in sinus rhythm.[id number] Score: 2. HASBLED Score: 2. Holding home Eliquis due to acute GI bleeding.

## 2024-09-28 NOTE — PROGRESS NOTES
[facility] Hospital Medicine  Progress Note    Patient Name: Roberth Alberto  MRN: [id number]  Patient Class: IP- Inpatient   Admission Date: 9/25/2024  Length of Stay: 3 days  Attending Physician: Michael Peñaloza Jr., MD  Primary Care Provider: Yessy Pruitt MD (Inactive)        Subjective:     Principal Problem:Gastrointestinal hemorrhage        HPI:  Patient is a 84 y/o male with PMHx of Afib on Eliquis, HTN, anxiety, GERD s/p esophageal dilation who presents to the ED with complaint of blood per rectum. Patient reports having dark blood per rectum with bowel movements since yesterday morning. He reports having GI bleed x3 in the past that required blood transfusion, last GI bleed was last year. He had esophageal dilation one month ago in [place] where he spends time with his significant other. Patient follows with cardiology, Dr. Kam, at [facility]. Patient denies smoking or heavy alcohol use. He lives alone. Patient denies fever, chills, nausea, vomiting, SOB, chest pain or urinary habit changes.    In the ED vital signs showed /78, HR 78, RR 18, SpO2 97% and afebrile. Blood work H/H 11/33, WBC 3.48, . No electrolyte abnormalities. BUN/Cr 19/1.13, glucose 110. FOBT positive. Patient will be admitted to the hospital for further management.    Overview/Hospital Course:  No notes on file    Interval History: Pt still endorses dark blood per rectum. No associated dizziness or fatigue.    Review of Systems   Constitutional:  Negative for activity change, appetite change, chills and fever.   HENT:  Negative for sore throat and trouble swallowing.    Respiratory:  Negative for apnea, cough and shortness of breath.    Cardiovascular:  Negative for chest pain.   Gastrointestinal:  Positive for anal bleeding and blood in stool. Negative for abdominal distention, abdominal pain, constipation, diarrhea, nausea, rectal pain and vomiting.   Genitourinary:  Negative for dysuria,  frequency, hematuria and urgency.   Musculoskeletal:  Negative for arthralgias and myalgias.   Skin:  Negative for color change.   Neurological:  Negative for weakness and headaches.   Psychiatric/Behavioral:  Negative for agitation, behavioral problems and confusion.      Objective:     Vital Signs (Most Recent):  Temp: 98.4 °F (36.9 °C) (09/28/24 1130)  Pulse: 65 (09/28/24 1130)  Resp: 14 (09/28/24 1130)  BP: 122/72 (09/28/24 1130)  SpO2: 97 % (09/28/24 1130) Vital Signs (24h Range):  Temp:  [97.6 °F (36.4 °C)-98.7 °F (37.1 °C)] 98.4 °F (36.9 °C)  Pulse:  [65-81] 65  Resp:  [14-18] 14  SpO2:  [97 %-99 %] 97 %  BP: (122-180)/(68-82) 122/72     Weight: 84.8 kg (186 lb 15.2 oz)  Body mass index is 28.43 kg/m².  No intake or output data in the 24 hours ending 09/28/24 1251      Physical Exam  Vitals and nursing note reviewed.   Constitutional:       General: He is not in acute distress.     Appearance: Normal appearance. He is normal weight. He is not ill-appearing, toxic-appearing or diaphoretic.   HENT:      Head: Normocephalic.      Nose: Nose normal. No congestion or rhinorrhea.      Mouth/Throat:      Mouth: Mucous membranes are moist.      Pharynx: Oropharynx is clear. No oropharyngeal exudate or posterior oropharyngeal erythema.   Eyes:      General: No scleral icterus.  Cardiovascular:      Rate and Rhythm: Normal rate and regular rhythm.   Pulmonary:      Effort: Pulmonary effort is normal.      Breath sounds: No wheezing, rhonchi or rales.   Abdominal:      General: Abdomen is flat. Bowel sounds are normal. There is no distension.      Palpations: Abdomen is soft.      Tenderness: There is no abdominal tenderness.   Musculoskeletal:         General: Normal range of motion.      Cervical back: Normal range of motion.      Right lower leg: No edema.      Left lower leg: No edema.   Skin:     General: Skin is warm and dry.      Coloration: Skin is not jaundiced.   Neurological:      Mental Status: He is alert  and oriented to person, place, and time. Mental status is at baseline.   Psychiatric:         Mood and Affect: Mood normal.         Behavior: Behavior normal.         Thought Content: Thought content normal.         Judgment: Judgment normal.             Significant Labs: All pertinent labs within the past 24 hours have been reviewed.  Recent Lab Results         09/28/24  0816   09/28/24  0352   09/27/24 1952        Anion Gap   9         Baso #   0.02         Basophil %   0.6         BUN   8         BUN/CREAT RATIO   8         Calcium   8.4         Chloride   106         CO2   28         Creatinine   0.98         Differential Method   Auto         eGFR   76         Eos #   0.19         Eos %   5.5         Glucose   109         Hematocrit 30.5   28.2   32.2       Hemoglobin 10.1   9.1   10.6       Immature Grans (Abs)   0.01         Immature Granulocytes   0.3         Lymph #   0.97         Lymph %   28.3         MCH   31.1         MCHC   32.3         MCV   96.2         Mono #   0.30         Mono %   8.7         MPV   9.9         Neutrophils, Abs   1.94         Neutrophils Relative   56.6         nRBC   0.0         NUCLEATED RBC ABSOLUTE   0.00         Platelet Count   149         Potassium   3.6         RBC   2.93         RDW   12.8         Sodium   139         WBC   3.43                 Significant Imaging: I have reviewed all pertinent imaging results/findings within the past 24 hours.  I have reviewed and interpreted all pertinent imaging results/findings within the past 24 hours.    Assessment/Plan:      * Gastrointestinal hemorrhage  Patient has acute blood loss due to hemorrhage, the hemorrhage is due to gastrointestinal bleed, patient does have a propensity for bleeding due to a medication, the medication is Eliquis. Will trend hemoglobin/hematocrit Every 8 hours, as well as monitor and correct for any coagulation defects. CBC and vital signs have been reviewed and last CBC was noted-   Lab Results    Component Value Date    WBC 3.43 (L) 09/28/2024    HGB 10.1 (L) 09/28/2024    HCT 30.5 (L) 09/28/2024    MCV 96.2 (H) 09/28/2024     (L) 09/28/2024       Hold home Eliquis.  IV Protonix 40mg BID.  Will order a type and screen and consent patient for blood transfusion. Will transfuse if Hgb is <7g/dl (<8g/dl in cases of active ACS) or if patient has rapid bleeding leading to hemodynamic instability.  GI consult, recommendations appreciated.    9/26  Per GI: Continue holding Eliquis; Continue with clear liquid diet; Consider colonoscopy once Eliquis held x 3 days   -Monitor H/H daily    9/27  Pt still endorses bloody stool.  Eliquis still held  H/H worsened but still stable  Monitor H/H daily    9/28  Pt still endorses dark red blood per rectum  H/H stable  Monitor H/H daily    Anxiety  Continue home sertraline.      Atrial fibrillation  Patient with Long standing persistent (>12 months) atrial fibrillation which is controlled currently with Beta Blocker. Patient is currently in sinus rhythm.[id number] Score: 2. HASBLED Score: 2. Holding home Eliquis due to acute GI bleeding.    Primary hypertension  Chronic, controlled. Latest blood pressure and vitals reviewed-     Temp:  [97.6 °F (36.4 °C)-98.7 °F (37.1 °C)]   Pulse:  [65-81]   Resp:  [14-18]   BP: (122-180)/(68-82)   SpO2:  [97 %-99 %] .   Home meds for hypertension were reviewed and noted below.   Hypertension Medications               losartan (COZAAR) 50 MG tablet Take 50 mg by mouth once daily.  TAKE ONE TABLET BY MOUTH DAILY FOR BLOOD PRESSURE STOP HCTZ FOR BLOOD PRESSURE    metoprolol tartrate (LOPRESSOR) 25 MG tablet Take 12.5 mg by mouth 2 (two) times daily.  TAKE ONE-HALF TABLET BY MOUTH 2 TIMES A DAY            While in the hospital, will manage blood pressure as follows; Continue home antihypertensive regimen    Will utilize p.r.n. blood pressure medication only if patient's blood pressure greater than 180/110 and he develops symptoms such as  worsening chest pain or shortness of breath.      VTE Risk Mitigation (From admission, onward)           Ordered     Reason for No Pharmacological VTE Prophylaxis  Once        Question:  Reasons:  Answer:  Active Bleeding    09/25/24 1331     IP VTE HIGH RISK PATIENT  Once         09/25/24 1331     Place sequential compression device  Until discontinued         09/25/24 1331                    Discharge Planning   MAKAYLA:      Code Status: Full Code   Is the patient medically ready for discharge?:     Reason for patient still in hospital (select all that apply): Treatment  Discharge Plan A: Home                  Christopher Henry Jr., MD  Department of Hospital Medicine   [facility]

## 2024-09-28 NOTE — PLAN OF CARE
Problem: Gastrointestinal Bleeding  Goal: Optimal Coping with Acute Illness  9/28/2024 1725 by Penelope Handy RN  Outcome: Progressing  9/28/2024 1434 by Penelope Handy RN  Outcome: Progressing  Goal: Hemostasis  9/28/2024 1725 by Penelope Handy RN  Outcome: Progressing  9/28/2024 1434 by Penelope Handy, RN  Outcome: Progressing     Problem: Adult Inpatient Plan of Care  Goal: Plan of Care Review  9/28/2024 1725 by Penelope Handy, RN  Outcome: Progressing  9/28/2024 1434 by Penelope Handy RN  Outcome: Progressing  Goal: Patient-Specific Goal (Individualized)  9/28/2024 1725 by Penelope Handy RN  Outcome: Progressing  9/28/2024 1434 by Penelope Handy RN  Outcome: Progressing  Goal: Absence of Hospital-Acquired Illness or Injury  9/28/2024 1725 by Penelope Handy RN  Outcome: Progressing  9/28/2024 1434 by Penelope Handy RN  Outcome: Progressing  Goal: Optimal Comfort and Wellbeing  9/28/2024 1725 by Penelope Handy RN  Outcome: Progressing  9/28/2024 1434 by Penelope Handy RN  Outcome: Progressing  Goal: Readiness for Transition of Care  9/28/2024 1725 by Penelope Handy RN  Outcome: Progressing  9/28/2024 1434 by Penelope Handy RN  Outcome: Progressing     Problem: Fall Injury Risk  Goal: Absence of Fall and Fall-Related Injury  9/28/2024 1725 by Penelope Handy RN  Outcome: Progressing  9/28/2024 1434 by Penelope Handy RN  Outcome: Progressing

## 2024-09-28 NOTE — ASSESSMENT & PLAN NOTE
9/28/2024- States seeing some dark red blood in stool but feels is small amount.Hgb 10.1 stable.Tolerating regular diet. Plan change to clear liquid diet in AM and prep for colonoscopy Monday.    09/27/2024-H&H stable.  Patient with darker stool reported as above.  Eliquis remains on hold.  Tolerating diet.  We will review case with Dr. Junior with plan an addendum to follow regarding colonoscopy.    9/26/24-continued rectal bleeding reported. Labs noted and reviewed. Eliquis remains on hold. Continue with clear liquid diet. Consider colonoscopy once Eliquis held x 3 days. Continue with current treatment plan. Plan and addendum to follow by Dr Junior.     9/25/24-Admitted with two episodes of rectal bleed with this morning being the last. Labs reviewed. HH stable. Note prior history as above. Last dose of Eliquis today. Would recommend holding Eliquis. Monitor HH. PPI. May begin clear liquids. Will review case with Dr Junior with plan and addendum to follow.

## 2024-09-28 NOTE — ASSESSMENT & PLAN NOTE
Chronic, controlled. Latest blood pressure and vitals reviewed-     Temp:  [97.6 °F (36.4 °C)-98.7 °F (37.1 °C)]   Pulse:  [65-81]   Resp:  [14-18]   BP: (122-180)/(68-82)   SpO2:  [97 %-99 %] .   Home meds for hypertension were reviewed and noted below.   Hypertension Medications               losartan (COZAAR) 50 MG tablet Take 50 mg by mouth once daily.  TAKE ONE TABLET BY MOUTH DAILY FOR BLOOD PRESSURE STOP HCTZ FOR BLOOD PRESSURE    metoprolol tartrate (LOPRESSOR) 25 MG tablet Take 12.5 mg by mouth 2 (two) times daily.  TAKE ONE-HALF TABLET BY MOUTH 2 TIMES A DAY            While in the hospital, will manage blood pressure as follows; Continue home antihypertensive regimen    Will utilize p.r.n. blood pressure medication only if patient's blood pressure greater than 180/110 and he develops symptoms such as worsening chest pain or shortness of breath.

## 2024-09-28 NOTE — ASSESSMENT & PLAN NOTE
Patient has acute blood loss due to hemorrhage, the hemorrhage is due to gastrointestinal bleed, patient does have a propensity for bleeding due to a medication, the medication is Eliquis. Will trend hemoglobin/hematocrit Every 8 hours, as well as monitor and correct for any coagulation defects. CBC and vital signs have been reviewed and last CBC was noted-   Lab Results   Component Value Date    WBC 3.43 (L) 09/28/2024    HGB 10.1 (L) 09/28/2024    HCT 30.5 (L) 09/28/2024    MCV 96.2 (H) 09/28/2024     (L) 09/28/2024       Hold home Eliquis.  IV Protonix 40mg BID.  Will order a type and screen and consent patient for blood transfusion. Will transfuse if Hgb is <7g/dl (<8g/dl in cases of active ACS) or if patient has rapid bleeding leading to hemodynamic instability.  GI consult, recommendations appreciated.    9/26  Per GI: Continue holding Eliquis; Continue with clear liquid diet; Consider colonoscopy once Eliquis held x 3 days   -Monitor H/H daily    9/27  Pt still endorses bloody stool.  Eliquis still held  H/H worsened but still stable  Monitor H/H daily    9/28  Pt still endorses dark red blood per rectum  H/H stable  Monitor H/H daily

## 2024-09-29 LAB
ANION GAP SERPL CALCULATED.3IONS-SCNC: 9 MMOL/L (ref 7–16)
BASOPHILS # BLD AUTO: 0.02 K/UL (ref 0–0.2)
BASOPHILS NFR BLD AUTO: 0.5 % (ref 0–1)
BUN SERPL-MCNC: 6 MG/DL (ref 7–18)
BUN/CREAT SERPL: 6 (ref 6–20)
CALCIUM SERPL-MCNC: 8.3 MG/DL (ref 8.5–10.1)
CHLORIDE SERPL-SCNC: 105 MMOL/L (ref 98–107)
CO2 SERPL-SCNC: 27 MMOL/L (ref 21–32)
CREAT SERPL-MCNC: 0.96 MG/DL (ref 0.7–1.3)
DIFFERENTIAL METHOD BLD: ABNORMAL
EGFR (NO RACE VARIABLE) (RUSH/TITUS): 77 ML/MIN/1.73M2
EOSINOPHIL # BLD AUTO: 0.23 K/UL (ref 0–0.5)
EOSINOPHIL NFR BLD AUTO: 6.3 % (ref 1–4)
ERYTHROCYTE [DISTWIDTH] IN BLOOD BY AUTOMATED COUNT: 12.8 % (ref 11.5–14.5)
GLUCOSE SERPL-MCNC: 105 MG/DL (ref 74–106)
HCT VFR BLD AUTO: 27 % (ref 40–54)
HGB BLD-MCNC: 9.1 G/DL (ref 13.5–18)
IMM GRANULOCYTES # BLD AUTO: 0 K/UL (ref 0–0.04)
IMM GRANULOCYTES NFR BLD: 0 % (ref 0–0.4)
LYMPHOCYTES # BLD AUTO: 1.47 K/UL (ref 1–4.8)
LYMPHOCYTES NFR BLD AUTO: 39.9 % (ref 27–41)
MCH RBC QN AUTO: 31.9 PG (ref 27–31)
MCHC RBC AUTO-ENTMCNC: 33.7 G/DL (ref 32–36)
MCV RBC AUTO: 94.7 FL (ref 80–96)
MONOCYTES # BLD AUTO: 0.39 K/UL (ref 0–0.8)
MONOCYTES NFR BLD AUTO: 10.6 % (ref 2–6)
MPC BLD CALC-MCNC: 9.6 FL (ref 9.4–12.4)
NEUTROPHILS # BLD AUTO: 1.57 K/UL (ref 1.8–7.7)
NEUTROPHILS NFR BLD AUTO: 42.7 % (ref 53–65)
NRBC # BLD AUTO: 0 X10E3/UL
NRBC, AUTO (.00): 0 %
PLATELET # BLD AUTO: 145 K/UL (ref 150–400)
POTASSIUM SERPL-SCNC: 3.6 MMOL/L (ref 3.5–5.1)
RBC # BLD AUTO: 2.85 M/UL (ref 4.6–6.2)
SODIUM SERPL-SCNC: 137 MMOL/L (ref 136–145)
WBC # BLD AUTO: 3.68 K/UL (ref 4.5–11)

## 2024-09-29 PROCEDURE — 80048 BASIC METABOLIC PNL TOTAL CA: CPT | Performed by: GENERAL PRACTICE

## 2024-09-29 PROCEDURE — 25000003 PHARM REV CODE 250: Performed by: INTERNAL MEDICINE

## 2024-09-29 PROCEDURE — 63600175 PHARM REV CODE 636 W HCPCS: Performed by: GENERAL PRACTICE

## 2024-09-29 PROCEDURE — 85025 COMPLETE CBC W/AUTO DIFF WBC: CPT | Performed by: GENERAL PRACTICE

## 2024-09-29 PROCEDURE — 25000003 PHARM REV CODE 250: Performed by: GENERAL PRACTICE

## 2024-09-29 PROCEDURE — 99232 SBSQ HOSP IP/OBS MODERATE 35: CPT | Mod: ,,, | Performed by: INTERNAL MEDICINE

## 2024-09-29 PROCEDURE — 11000001 HC ACUTE MED/SURG PRIVATE ROOM

## 2024-09-29 PROCEDURE — 25000003 PHARM REV CODE 250: Performed by: FAMILY MEDICINE

## 2024-09-29 PROCEDURE — 36415 COLL VENOUS BLD VENIPUNCTURE: CPT | Performed by: GENERAL PRACTICE

## 2024-09-29 PROCEDURE — 99232 SBSQ HOSP IP/OBS MODERATE 35: CPT | Mod: GC,,, | Performed by: FAMILY MEDICINE

## 2024-09-29 RX ORDER — POLYETHYLENE GLYCOL 3350, SODIUM SULFATE ANHYDROUS, SODIUM BICARBONATE, SODIUM CHLORIDE, POTASSIUM CHLORIDE 236; 22.74; 6.74; 5.86; 2.97 G/4L; G/4L; G/4L; G/4L; G/4L
4000 POWDER, FOR SOLUTION ORAL ONCE
Status: COMPLETED | OUTPATIENT
Start: 2024-09-29 | End: 2024-09-29

## 2024-09-29 RX ADMIN — SODIUM CHLORIDE: 900 INJECTION, SOLUTION INTRAVENOUS at 10:09

## 2024-09-29 RX ADMIN — SODIUM CHLORIDE: 900 INJECTION, SOLUTION INTRAVENOUS at 08:09

## 2024-09-29 RX ADMIN — LOSARTAN POTASSIUM 50 MG: 50 TABLET, FILM COATED ORAL at 08:09

## 2024-09-29 RX ADMIN — ATORVASTATIN CALCIUM 20 MG: 20 TABLET, FILM COATED ORAL at 09:09

## 2024-09-29 RX ADMIN — METOPROLOL TARTRATE 12.5 MG: 25 TABLET, FILM COATED ORAL at 08:09

## 2024-09-29 RX ADMIN — SERTRALINE HYDROCHLORIDE 50 MG: 50 TABLET ORAL at 08:09

## 2024-09-29 RX ADMIN — POLYETHYLENE GLYCOL 3350, SODIUM SULFATE ANHYDROUS, SODIUM BICARBONATE, SODIUM CHLORIDE, POTASSIUM CHLORIDE 4000 ML: 236; 22.74; 6.74; 5.86; 2.97 POWDER, FOR SOLUTION ORAL at 02:09

## 2024-09-29 RX ADMIN — PANTOPRAZOLE SODIUM 40 MG: 40 INJECTION, POWDER, LYOPHILIZED, FOR SOLUTION INTRAVENOUS at 08:09

## 2024-09-29 RX ADMIN — PANTOPRAZOLE SODIUM 40 MG: 40 INJECTION, POWDER, LYOPHILIZED, FOR SOLUTION INTRAVENOUS at 09:09

## 2024-09-29 RX ADMIN — METOPROLOL TARTRATE 12.5 MG: 25 TABLET, FILM COATED ORAL at 09:09

## 2024-09-29 NOTE — ASSESSMENT & PLAN NOTE
Patient has acute blood loss due to hemorrhage, the hemorrhage is due to gastrointestinal bleed, patient does have a propensity for bleeding due to a medication, the medication is Eliquis. Will trend hemoglobin/hematocrit Every 8 hours, as well as monitor and correct for any coagulation defects. CBC and vital signs have been reviewed and last CBC was noted-   Lab Results   Component Value Date    WBC 3.68 (L) 09/29/2024    HGB 9.1 (L) 09/29/2024    HCT 27.0 (L) 09/29/2024    MCV 94.7 09/29/2024     (L) 09/29/2024       Hold home Eliquis.  IV Protonix 40mg BID.  Will order a type and screen and consent patient for blood transfusion. Will transfuse if Hgb is <7g/dl (<8g/dl in cases of active ACS) or if patient has rapid bleeding leading to hemodynamic instability.  GI consult, recommendations appreciated.    9/26  Per GI: Continue holding Eliquis; Continue with clear liquid diet; Consider colonoscopy once Eliquis held x 3 days   -Monitor H/H daily    9/27  Pt still endorses bloody stool.  Eliquis still held  H/H worsened but still stable  Monitor H/H daily    9/28  Pt still endorses dark red blood per rectum  H/H stable  Monitor H/H daily    9/29  Patient states bowel movement today with no blood noted.   States that yesterday noted blood but has been progressively reducing.    Gastroenterology onboard and plan for prep today and colonoscopy tomorrow.   Holding Eliquis. H/H stable.

## 2024-09-29 NOTE — ASSESSMENT & PLAN NOTE
Patient with Long standing persistent (>12 months) atrial fibrillation which is controlled currently with Beta Blocker. Patient is currently in sinus rhythm.[id number] Score: 2. HASBLED Score: 2. Holding home Eliquis due to acute GI bleeding.

## 2024-09-29 NOTE — PLAN OF CARE
Problem: Gastrointestinal Bleeding  Goal: Optimal Coping with Acute Illness  Outcome: Progressing  Goal: Hemostasis  Outcome: Progressing     Problem: Gastrointestinal Bleeding  Goal: Optimal Coping with Acute Illness  Outcome: Progressing     Problem: Gastrointestinal Bleeding  Goal: Hemostasis  Outcome: Progressing     Problem: Adult Inpatient Plan of Care  Goal: Plan of Care Review  Outcome: Progressing  Goal: Patient-Specific Goal (Individualized)  Outcome: Progressing  Goal: Absence of Hospital-Acquired Illness or Injury  Outcome: Progressing  Goal: Optimal Comfort and Wellbeing  Outcome: Progressing     Problem: Adult Inpatient Plan of Care  Goal: Plan of Care Review  Outcome: Progressing     Problem: Adult Inpatient Plan of Care  Goal: Patient-Specific Goal (Individualized)  Outcome: Progressing     Problem: Adult Inpatient Plan of Care  Goal: Absence of Hospital-Acquired Illness or Injury  Outcome: Progressing     Problem: Adult Inpatient Plan of Care  Goal: Optimal Comfort and Wellbeing  Outcome: Progressing     Problem: Fall Injury Risk  Goal: Absence of Fall and Fall-Related Injury  Outcome: Progressing     Problem: Fall Injury Risk  Goal: Absence of Fall and Fall-Related Injury  Outcome: Progressing

## 2024-09-29 NOTE — PROGRESS NOTES
[facility] Hospital Medicine  Progress Note    Patient Name: Roberth Alberto  MRN: [id number]  Patient Class: IP- Inpatient   Admission Date: 9/25/2024  Length of Stay: 4 days  Attending Physician: Michael Peñaloza Jr., MD  Primary Care Provider: Yessy Pruitt MD (Inactive)        Subjective:     Principal Problem:Gastrointestinal hemorrhage        HPI:  Patient is a 84 y/o male with PMHx of Afib on Eliquis, HTN, anxiety, GERD s/p esophageal dilation who presents to the ED with complaint of blood per rectum. Patient reports having dark blood per rectum with bowel movements since yesterday morning. He reports having GI bleed x3 in the past that required blood transfusion, last GI bleed was last year. He had esophageal dilation one month ago in [place] where he spends time with his significant other. Patient follows with cardiology, Dr. Kam, at [facility]. Patient denies smoking or heavy alcohol use. He lives alone. Patient denies fever, chills, nausea, vomiting, SOB, chest pain or urinary habit changes.    In the ED vital signs showed /78, HR 78, RR 18, SpO2 97% and afebrile. Blood work H/H 11/33, WBC 3.48, . No electrolyte abnormalities. BUN/Cr 19/1.13, glucose 110. FOBT positive. Patient will be admitted to the hospital for further management.    Overview/Hospital Course:  No notes on file    Interval History: Patient states bowel movement today with no blood noted. States that yesterday noted blood but has been progressively reducing.  Gastroenterology onboard and plan for prep today and colonoscopy tomorrow. Holding Eliquis. H/H stable.    Review of Systems   Constitutional:  Negative for chills and fever.   HENT:  Negative for sore throat.    Respiratory:  Negative for cough and shortness of breath.    Cardiovascular:  Negative for chest pain.   Gastrointestinal:  Negative for abdominal pain, nausea and vomiting.   Genitourinary:  Negative for enuresis and flank pain.    Neurological:  Negative for syncope, speech difficulty and weakness.   Psychiatric/Behavioral:  Negative for confusion.      Objective:     Vital Signs (Most Recent):  Temp: 98.1 °F (36.7 °C) (09/29/24 1229)  Pulse: 70 (09/29/24 1229)  Resp: 18 (09/29/24 1229)  BP: (!) 148/76 (09/29/24 1229)  SpO2: 97 % (09/29/24 1229) Vital Signs (24h Range):  Temp:  [97.7 °F (36.5 °C)-98.5 °F (36.9 °C)] 98.1 °F (36.7 °C)  Pulse:  [64-75] 70  Resp:  [14-19] 18  SpO2:  [97 %-100 %] 97 %  BP: (138-167)/(70-77) 148/76     Weight: 84.8 kg (186 lb 15.2 oz)  Body mass index is 28.43 kg/m².    Intake/Output Summary (Last 24 hours) at 9/29/2024 1500  Last data filed at 9/28/2024 2028  Gross per 24 hour   Intake 5263.38 ml   Output --   Net 5263.38 ml         Physical Exam  Vitals reviewed.   Constitutional:       General: He is not in acute distress.     Appearance: He is not toxic-appearing.   HENT:      Head: Normocephalic and atraumatic.      Right Ear: External ear normal.      Left Ear: External ear normal.      Nose: Nose normal.      Mouth/Throat:      Mouth: Mucous membranes are moist.   Eyes:      General: No scleral icterus.     Conjunctiva/sclera: Conjunctivae normal.   Cardiovascular:      Rate and Rhythm: Normal rate and regular rhythm.      Pulses: Normal pulses.      Heart sounds: Normal heart sounds. No murmur heard.  Pulmonary:      Effort: Pulmonary effort is normal. No respiratory distress.      Breath sounds: Normal breath sounds.   Abdominal:      General: Bowel sounds are normal. There is no distension.      Palpations: Abdomen is soft.      Tenderness: There is no abdominal tenderness.   Skin:     General: Skin is warm.   Neurological:      General: No focal deficit present.      Mental Status: He is alert.   Psychiatric:         Mood and Affect: Mood normal.         Thought Content: Thought content normal.             Significant Labs: All pertinent labs within the past 24 hours have been reviewed.    Significant  Imaging: I have reviewed all pertinent imaging results/findings within the past 24 hours.    Assessment/Plan:      * Gastrointestinal hemorrhage  Patient has acute blood loss due to hemorrhage, the hemorrhage is due to gastrointestinal bleed, patient does have a propensity for bleeding due to a medication, the medication is Eliquis. Will trend hemoglobin/hematocrit Every 8 hours, as well as monitor and correct for any coagulation defects. CBC and vital signs have been reviewed and last CBC was noted-   Lab Results   Component Value Date    WBC 3.68 (L) 09/29/2024    HGB 9.1 (L) 09/29/2024    HCT 27.0 (L) 09/29/2024    MCV 94.7 09/29/2024     (L) 09/29/2024       Hold home Eliquis.  IV Protonix 40mg BID.  Will order a type and screen and consent patient for blood transfusion. Will transfuse if Hgb is <7g/dl (<8g/dl in cases of active ACS) or if patient has rapid bleeding leading to hemodynamic instability.  GI consult, recommendations appreciated.    9/26  Per GI: Continue holding Eliquis; Continue with clear liquid diet; Consider colonoscopy once Eliquis held x 3 days   -Monitor H/H daily    9/27  Pt still endorses bloody stool.  Eliquis still held  H/H worsened but still stable  Monitor H/H daily    9/28  Pt still endorses dark red blood per rectum  H/H stable  Monitor H/H daily    9/29  Patient states bowel movement today with no blood noted.   States that yesterday noted blood but has been progressively reducing.    Gastroenterology onboard and plan for prep today and colonoscopy tomorrow.   Holding Eliquis. H/H stable.    Primary hypertension  Chronic, controlled. Latest blood pressure and vitals reviewed-     Temp:  [97.7 °F (36.5 °C)-98.5 °F (36.9 °C)]   Pulse:  [64-75]   Resp:  [14-19]   BP: (138-167)/(70-77)   SpO2:  [97 %-100 %] .   Home meds for hypertension were reviewed and noted below.   Hypertension Medications               losartan (COZAAR) 50 MG tablet Take 50 mg by mouth once daily.  TAKE  ONE TABLET BY MOUTH DAILY FOR BLOOD PRESSURE STOP HCTZ FOR BLOOD PRESSURE    metoprolol tartrate (LOPRESSOR) 25 MG tablet Take 12.5 mg by mouth 2 (two) times daily.  TAKE ONE-HALF TABLET BY MOUTH 2 TIMES A DAY            While in the hospital, will manage blood pressure as follows; Continue home antihypertensive regimen    Will utilize p.r.n. blood pressure medication only if patient's blood pressure greater than 180/110 and he develops symptoms such as worsening chest pain or shortness of breath.    Anxiety  Continue home sertraline.      Atrial fibrillation  Patient with Long standing persistent (>12 months) atrial fibrillation which is controlled currently with Beta Blocker. Patient is currently in sinus rhythm.[id number] Score: 2. HASBLED Score: 2. Holding home Eliquis due to acute GI bleeding.      VTE Risk Mitigation (From admission, onward)           Ordered     Reason for No Pharmacological VTE Prophylaxis  Once        Question:  Reasons:  Answer:  Active Bleeding    09/25/24 1331     IP VTE HIGH RISK PATIENT  Once         09/25/24 1331     Place sequential compression device  Until discontinued         09/25/24 1331                    Discharge Planning   MAKAYLA:      Code Status: Full Code   Is the patient medically ready for discharge?:     Reason for patient still in hospital (select all that apply): Treatment and Consult recommendations  Discharge Plan A: Home                  Alfie Lowe MD  Department of Hospital Medicine   [facility]

## 2024-09-29 NOTE — ASSESSMENT & PLAN NOTE
Chronic, controlled. Latest blood pressure and vitals reviewed-     Temp:  [97.7 °F (36.5 °C)-98.5 °F (36.9 °C)]   Pulse:  [64-75]   Resp:  [14-19]   BP: (138-167)/(70-77)   SpO2:  [97 %-100 %] .   Home meds for hypertension were reviewed and noted below.   Hypertension Medications               losartan (COZAAR) 50 MG tablet Take 50 mg by mouth once daily.  TAKE ONE TABLET BY MOUTH DAILY FOR BLOOD PRESSURE STOP HCTZ FOR BLOOD PRESSURE    metoprolol tartrate (LOPRESSOR) 25 MG tablet Take 12.5 mg by mouth 2 (two) times daily.  TAKE ONE-HALF TABLET BY MOUTH 2 TIMES A DAY            While in the hospital, will manage blood pressure as follows; Continue home antihypertensive regimen    Will utilize p.r.n. blood pressure medication only if patient's blood pressure greater than 180/110 and he develops symptoms such as worsening chest pain or shortness of breath.

## 2024-09-29 NOTE — SUBJECTIVE & OBJECTIVE
Subjective:     Interval History:  Denies bleeding overnight.  Hemoglobin stable.    Review of Systems   Constitutional:  Negative for chills and fever.   HENT:  Negative for sore throat.    Respiratory:  Negative for cough and shortness of breath.    Cardiovascular:  Negative for chest pain.   Gastrointestinal:  Negative for abdominal pain, nausea and vomiting.   Genitourinary:  Negative for enuresis and flank pain.   Neurological:  Negative for syncope, speech difficulty and weakness.   Psychiatric/Behavioral:  Negative for confusion.      Objective:     Vital Signs (Most Recent):  Temp: 98.1 °F (36.7 °C) (09/29/24 1229)  Pulse: 70 (09/29/24 1229)  Resp: 18 (09/29/24 1229)  BP: (!) 148/76 (09/29/24 1229)  SpO2: 97 % (09/29/24 1229) Vital Signs (24h Range):  Temp:  [97.7 °F (36.5 °C)-98.5 °F (36.9 °C)] 98.1 °F (36.7 °C)  Pulse:  [64-75] 70  Resp:  [14-19] 18  SpO2:  [97 %-100 %] 97 %  BP: (138-167)/(70-77) 148/76     Weight: 84.8 kg (186 lb 15.2 oz) (09/27/24 0035)  Body mass index is 28.43 kg/m².      Intake/Output Summary (Last 24 hours) at 9/29/2024 1431  Last data filed at 9/28/2024 2028  Gross per 24 hour   Intake 5263.38 ml   Output --   Net 5263.38 ml       Lines/Drains/Airways       Peripheral Intravenous Line  Duration                  Peripheral IV - Single Lumen 09/25/24 1016 20 G Anterior;Distal;Right Upper Arm 4 days                     Physical Exam  Vitals and nursing note reviewed.   Constitutional:       General: He is not in acute distress.     Appearance: He is normal weight. He is not toxic-appearing.   HENT:      Head: Normocephalic and atraumatic.   Eyes:      General: No scleral icterus.     Extraocular Movements: Extraocular movements intact.   Cardiovascular:      Rate and Rhythm: Normal rate and regular rhythm.      Pulses: Normal pulses.      Heart sounds: Normal heart sounds.   Pulmonary:      Effort: Pulmonary effort is normal. No respiratory distress.      Breath sounds: Normal  breath sounds.   Abdominal:      General: Bowel sounds are normal. There is no distension.      Palpations: Abdomen is soft.      Tenderness: There is no abdominal tenderness.   Skin:     General: Skin is warm and dry.   Neurological:      General: No focal deficit present.      Mental Status: He is alert and oriented to person, place, and time.      Cranial Nerves: No cranial nerve deficit.          Significant Labs:  Recent Lab Results         09/29/24  0317   09/29/24  0316        Anion Gap 9         Baso #   0.02       Basophil %   0.5       BUN 6         BUN/CREAT RATIO 6         Calcium 8.3         Chloride 105         CO2 27         Creatinine 0.96         Differential Method   Auto       eGFR 77         Eos #   0.23       Eos %   6.3       Glucose 105         Hematocrit   27.0       Hemoglobin   9.1       Immature Grans (Abs)   0.00       Immature Granulocytes   0.0       Lymph #   1.47       Lymph %   39.9       MCH   31.9       MCHC   33.7       MCV   94.7       Mono #   0.39       Mono %   10.6       MPV   9.6       Neutrophils, Abs   1.57       Neutrophils Relative   42.7       nRBC   0.0       NUCLEATED RBC ABSOLUTE   0.00       Platelet Count   145       Potassium 3.6         RBC   2.85       RDW   12.8       Sodium 137         WBC   3.68                 Significant Imaging:  Imaging results within the past 24 hours have been reviewed.

## 2024-09-29 NOTE — PROGRESS NOTES
[facility]  Gastroenterology  Progress Note    Patient Name: Roberth Alberto  MRN: [id number]  Admission Date: 9/25/2024  Hospital Length of Stay: 4 days  Code Status: Full Code   Attending Provider: Michael Peñaloza Jr., MD  Consulting Provider: LALO Junior MD  Primary Care Physician: Yessy Pruitt MD (Inactive)  Principal Problem: Gastrointestinal hemorrhage        Subjective:     Interval History:  Denies bleeding overnight.  Hemoglobin stable.    Review of Systems   Constitutional:  Negative for chills and fever.   HENT:  Negative for sore throat.    Respiratory:  Negative for cough and shortness of breath.    Cardiovascular:  Negative for chest pain.   Gastrointestinal:  Negative for abdominal pain, nausea and vomiting.   Genitourinary:  Negative for enuresis and flank pain.   Neurological:  Negative for syncope, speech difficulty and weakness.   Psychiatric/Behavioral:  Negative for confusion.      Objective:     Vital Signs (Most Recent):  Temp: 98.1 °F (36.7 °C) (09/29/24 1229)  Pulse: 70 (09/29/24 1229)  Resp: 18 (09/29/24 1229)  BP: (!) 148/76 (09/29/24 1229)  SpO2: 97 % (09/29/24 1229) Vital Signs (24h Range):  Temp:  [97.7 °F (36.5 °C)-98.5 °F (36.9 °C)] 98.1 °F (36.7 °C)  Pulse:  [64-75] 70  Resp:  [14-19] 18  SpO2:  [97 %-100 %] 97 %  BP: (138-167)/(70-77) 148/76     Weight: 84.8 kg (186 lb 15.2 oz) (09/27/24 0035)  Body mass index is 28.43 kg/m².      Intake/Output Summary (Last 24 hours) at 9/29/2024 1431  Last data filed at 9/28/2024 2028  Gross per 24 hour   Intake 5263.38 ml   Output --   Net 5263.38 ml       Lines/Drains/Airways       Peripheral Intravenous Line  Duration                  Peripheral IV - Single Lumen 09/25/24 1016 20 G Anterior;Distal;Right Upper Arm 4 days                     Physical Exam  Vitals and nursing note reviewed.   Constitutional:       General: He is not in acute distress.     Appearance: He is normal weight. He is not  toxic-appearing.   HENT:      Head: Normocephalic and atraumatic.   Eyes:      General: No scleral icterus.     Extraocular Movements: Extraocular movements intact.   Cardiovascular:      Rate and Rhythm: Normal rate and regular rhythm.      Pulses: Normal pulses.      Heart sounds: Normal heart sounds.   Pulmonary:      Effort: Pulmonary effort is normal. No respiratory distress.      Breath sounds: Normal breath sounds.   Abdominal:      General: Bowel sounds are normal. There is no distension.      Palpations: Abdomen is soft.      Tenderness: There is no abdominal tenderness.   Skin:     General: Skin is warm and dry.   Neurological:      General: No focal deficit present.      Mental Status: He is alert and oriented to person, place, and time.      Cranial Nerves: No cranial nerve deficit.          Significant Labs:  Recent Lab Results         09/29/24  0317   09/29/24  0316        Anion Gap 9         Baso #   0.02       Basophil %   0.5       BUN 6         BUN/CREAT RATIO 6         Calcium 8.3         Chloride 105         CO2 27         Creatinine 0.96         Differential Method   Auto       eGFR 77         Eos #   0.23       Eos %   6.3       Glucose 105         Hematocrit   27.0       Hemoglobin   9.1       Immature Grans (Abs)   0.00       Immature Granulocytes   0.0       Lymph #   1.47       Lymph %   39.9       MCH   31.9       MCHC   33.7       MCV   94.7       Mono #   0.39       Mono %   10.6       MPV   9.6       Neutrophils, Abs   1.57       Neutrophils Relative   42.7       nRBC   0.0       NUCLEATED RBC ABSOLUTE   0.00       Platelet Count   145       Potassium 3.6         RBC   2.85       RDW   12.8       Sodium 137         WBC   3.68                 Significant Imaging:  Imaging results within the past 24 hours have been reviewed.  Assessment/Plan:     GI  * Gastrointestinal hemorrhage  9/29/2024-denies further bleeding overnight.  Eliquis continues to be held.  Discussed with him and he agrees  to take bowel prep today for colonoscopy tomorrow.  He could probably be discharged home tomorrow if no further bleeding occurs.      9/28/2024- States seeing some dark red blood in stool but feels is small amount.Hgb 10.1 stable.Tolerating regular diet. Plan change to clear liquid diet in AM and prep for colonoscopy Monday.    09/27/2024-H&H stable.  Patient with darker stool reported as above.  Eliquis remains on hold.  Tolerating diet.  We will review case with Dr. Junior with plan an addendum to follow regarding colonoscopy.    9/26/24-continued rectal bleeding reported. Labs noted and reviewed. Eliquis remains on hold. Continue with clear liquid diet. Consider colonoscopy once Eliquis held x 3 days. Continue with current treatment plan. Plan and addendum to follow by Dr Junior.     9/25/24-Admitted with two episodes of rectal bleed with this morning being the last. Labs reviewed. HH stable. Note prior history as above. Last dose of Eliquis today. Would recommend holding Eliquis. Monitor HH. PPI. May begin clear liquids. Will review case with Dr Junior with plan and addendum to follow.         Thank you for your consult.  Dr. Dumont will assume GI care tomorrow.    LALO Junior MD  Gastroenterology  [facility]

## 2024-09-29 NOTE — SUBJECTIVE & OBJECTIVE
Interval History: Patient states bowel movement today with no blood noted. States that yesterday noted blood but has been progressively reducing.  Gastroenterology onboard and plan for prep today and colonoscopy tomorrow. Holding Eliquis. H/H stable.    Review of Systems   Constitutional:  Negative for chills and fever.   HENT:  Negative for sore throat.    Respiratory:  Negative for cough and shortness of breath.    Cardiovascular:  Negative for chest pain.   Gastrointestinal:  Negative for abdominal pain, nausea and vomiting.   Genitourinary:  Negative for enuresis and flank pain.   Neurological:  Negative for syncope, speech difficulty and weakness.   Psychiatric/Behavioral:  Negative for confusion.      Objective:     Vital Signs (Most Recent):  Temp: 98.1 °F (36.7 °C) (09/29/24 1229)  Pulse: 70 (09/29/24 1229)  Resp: 18 (09/29/24 1229)  BP: (!) 148/76 (09/29/24 1229)  SpO2: 97 % (09/29/24 1229) Vital Signs (24h Range):  Temp:  [97.7 °F (36.5 °C)-98.5 °F (36.9 °C)] 98.1 °F (36.7 °C)  Pulse:  [64-75] 70  Resp:  [14-19] 18  SpO2:  [97 %-100 %] 97 %  BP: (138-167)/(70-77) 148/76     Weight: 84.8 kg (186 lb 15.2 oz)  Body mass index is 28.43 kg/m².    Intake/Output Summary (Last 24 hours) at 9/29/2024 1500  Last data filed at 9/28/2024 2028  Gross per 24 hour   Intake 5263.38 ml   Output --   Net 5263.38 ml         Physical Exam  Vitals reviewed.   Constitutional:       General: He is not in acute distress.     Appearance: He is not toxic-appearing.   HENT:      Head: Normocephalic and atraumatic.      Right Ear: External ear normal.      Left Ear: External ear normal.      Nose: Nose normal.      Mouth/Throat:      Mouth: Mucous membranes are moist.   Eyes:      General: No scleral icterus.     Conjunctiva/sclera: Conjunctivae normal.   Cardiovascular:      Rate and Rhythm: Normal rate and regular rhythm.      Pulses: Normal pulses.      Heart sounds: Normal heart sounds. No murmur heard.  Pulmonary:      Effort:  Pulmonary effort is normal. No respiratory distress.      Breath sounds: Normal breath sounds.   Abdominal:      General: Bowel sounds are normal. There is no distension.      Palpations: Abdomen is soft.      Tenderness: There is no abdominal tenderness.   Skin:     General: Skin is warm.   Neurological:      General: No focal deficit present.      Mental Status: He is alert.   Psychiatric:         Mood and Affect: Mood normal.         Thought Content: Thought content normal.             Significant Labs: All pertinent labs within the past 24 hours have been reviewed.    Significant Imaging: I have reviewed all pertinent imaging results/findings within the past 24 hours.

## 2024-09-29 NOTE — ASSESSMENT & PLAN NOTE
9/29/2024-denies further bleeding overnight.  Eliquis continues to be held.  Discussed with him and he agrees to take bowel prep today for colonoscopy tomorrow.  He could probably be discharged home tomorrow if no further bleeding occurs.      9/28/2024- States seeing some dark red blood in stool but feels is small amount.Hgb 10.1 stable.Tolerating regular diet. Plan change to clear liquid diet in AM and prep for colonoscopy Monday.    09/27/2024-H&H stable.  Patient with darker stool reported as above.  Eliquis remains on hold.  Tolerating diet.  We will review case with Dr. Junior with plan an addendum to follow regarding colonoscopy.    9/26/24-continued rectal bleeding reported. Labs noted and reviewed. Eliquis remains on hold. Continue with clear liquid diet. Consider colonoscopy once Eliquis held x 3 days. Continue with current treatment plan. Plan and addendum to follow by Dr Junior.     9/25/24-Admitted with two episodes of rectal bleed with this morning being the last. Labs reviewed. HH stable. Note prior history as above. Last dose of Eliquis today. Would recommend holding Eliquis. Monitor HH. PPI. May begin clear liquids. Will review case with Dr Junior with plan and addendum to follow.

## 2024-09-30 ENCOUNTER — ANESTHESIA EVENT (OUTPATIENT)
Dept: GASTROENTEROLOGY | Facility: HOSPITAL | Age: 84
DRG: 378 | End: 2024-09-30
Payer: MEDICARE

## 2024-09-30 ENCOUNTER — ANESTHESIA (OUTPATIENT)
Dept: GASTROENTEROLOGY | Facility: HOSPITAL | Age: 84
DRG: 378 | End: 2024-09-30
Payer: MEDICARE

## 2024-09-30 VITALS
SYSTOLIC BLOOD PRESSURE: 136 MMHG | HEART RATE: 80 BPM | RESPIRATION RATE: 18 BRPM | BODY MASS INDEX: 28.33 KG/M2 | WEIGHT: 186.94 LBS | HEIGHT: 68 IN | OXYGEN SATURATION: 96 % | TEMPERATURE: 99 F | DIASTOLIC BLOOD PRESSURE: 68 MMHG

## 2024-09-30 PROBLEM — D62 ACUTE BLOOD LOSS ANEMIA: Status: ACTIVE | Noted: 2024-09-30

## 2024-09-30 PROBLEM — K57.30 COLON, DIVERTICULOSIS: Status: ACTIVE | Noted: 2024-09-30

## 2024-09-30 LAB
ANION GAP SERPL CALCULATED.3IONS-SCNC: 8 MMOL/L (ref 7–16)
BASOPHILS # BLD AUTO: 0.02 K/UL (ref 0–0.2)
BASOPHILS NFR BLD AUTO: 0.5 % (ref 0–1)
BUN SERPL-MCNC: 4 MG/DL (ref 7–18)
BUN/CREAT SERPL: 4 (ref 6–20)
CALCIUM SERPL-MCNC: 8.3 MG/DL (ref 8.5–10.1)
CHLORIDE SERPL-SCNC: 107 MMOL/L (ref 98–107)
CO2 SERPL-SCNC: 29 MMOL/L (ref 21–32)
CREAT SERPL-MCNC: 0.91 MG/DL (ref 0.7–1.3)
DIFFERENTIAL METHOD BLD: ABNORMAL
EGFR (NO RACE VARIABLE) (RUSH/TITUS): 83 ML/MIN/1.73M2
EOSINOPHIL # BLD AUTO: 0.23 K/UL (ref 0–0.5)
EOSINOPHIL NFR BLD AUTO: 5.5 % (ref 1–4)
ERYTHROCYTE [DISTWIDTH] IN BLOOD BY AUTOMATED COUNT: 12.7 % (ref 11.5–14.5)
FERRITIN SERPL-MCNC: 115 NG/ML (ref 26–388)
GLUCOSE SERPL-MCNC: 106 MG/DL (ref 74–106)
HCT VFR BLD AUTO: 26.7 % (ref 40–54)
HGB BLD-MCNC: 8.8 G/DL (ref 13.5–18)
IMM GRANULOCYTES # BLD AUTO: 0.01 K/UL (ref 0–0.04)
IMM GRANULOCYTES NFR BLD: 0.2 % (ref 0–0.4)
IRON SATN MFR SERPL: 14 % (ref 14–50)
IRON SERPL-MCNC: 36 ΜG/DL (ref 65–175)
LYMPHOCYTES # BLD AUTO: 1.26 K/UL (ref 1–4.8)
LYMPHOCYTES NFR BLD AUTO: 30.3 % (ref 27–41)
MCH RBC QN AUTO: 31.4 PG (ref 27–31)
MCHC RBC AUTO-ENTMCNC: 33 G/DL (ref 32–36)
MCV RBC AUTO: 95.4 FL (ref 80–96)
MONOCYTES # BLD AUTO: 0.38 K/UL (ref 0–0.8)
MONOCYTES NFR BLD AUTO: 9.1 % (ref 2–6)
MPC BLD CALC-MCNC: 9.5 FL (ref 9.4–12.4)
NEUTROPHILS # BLD AUTO: 2.26 K/UL (ref 1.8–7.7)
NEUTROPHILS NFR BLD AUTO: 54.4 % (ref 53–65)
NRBC # BLD AUTO: 0 X10E3/UL
NRBC, AUTO (.00): 0 %
PLATELET # BLD AUTO: 150 K/UL (ref 150–400)
POTASSIUM SERPL-SCNC: 3.4 MMOL/L (ref 3.5–5.1)
RBC # BLD AUTO: 2.8 M/UL (ref 4.6–6.2)
SODIUM SERPL-SCNC: 141 MMOL/L (ref 136–145)
TIBC SERPL-MCNC: 262 ΜG/DL (ref 250–450)
WBC # BLD AUTO: 4.16 K/UL (ref 4.5–11)

## 2024-09-30 PROCEDURE — 0DJD8ZZ INSPECTION OF LOWER INTESTINAL TRACT, VIA NATURAL OR ARTIFICIAL OPENING ENDOSCOPIC: ICD-10-PCS | Performed by: INTERNAL MEDICINE

## 2024-09-30 PROCEDURE — 45378 DIAGNOSTIC COLONOSCOPY: CPT | Mod: ,,, | Performed by: INTERNAL MEDICINE

## 2024-09-30 PROCEDURE — 45378 DIAGNOSTIC COLONOSCOPY: CPT | Performed by: INTERNAL MEDICINE

## 2024-09-30 PROCEDURE — 36415 COLL VENOUS BLD VENIPUNCTURE: CPT | Performed by: INTERNAL MEDICINE

## 2024-09-30 PROCEDURE — 83550 IRON BINDING TEST: CPT | Performed by: INTERNAL MEDICINE

## 2024-09-30 PROCEDURE — 25000003 PHARM REV CODE 250: Performed by: GENERAL PRACTICE

## 2024-09-30 PROCEDURE — 85025 COMPLETE CBC W/AUTO DIFF WBC: CPT | Performed by: GENERAL PRACTICE

## 2024-09-30 PROCEDURE — 63600175 PHARM REV CODE 636 W HCPCS: Performed by: NURSE ANESTHETIST, CERTIFIED REGISTERED

## 2024-09-30 PROCEDURE — 83540 ASSAY OF IRON: CPT | Performed by: INTERNAL MEDICINE

## 2024-09-30 PROCEDURE — D9220A PRA ANESTHESIA: Mod: ,,, | Performed by: NURSE ANESTHETIST, CERTIFIED REGISTERED

## 2024-09-30 PROCEDURE — 36415 COLL VENOUS BLD VENIPUNCTURE: CPT | Performed by: GENERAL PRACTICE

## 2024-09-30 PROCEDURE — 80048 BASIC METABOLIC PNL TOTAL CA: CPT | Performed by: GENERAL PRACTICE

## 2024-09-30 PROCEDURE — 25000003 PHARM REV CODE 250: Performed by: NURSE ANESTHETIST, CERTIFIED REGISTERED

## 2024-09-30 PROCEDURE — 82728 ASSAY OF FERRITIN: CPT | Performed by: INTERNAL MEDICINE

## 2024-09-30 PROCEDURE — 99239 HOSP IP/OBS DSCHRG MGMT >30: CPT | Mod: ,,, | Performed by: HOSPITALIST

## 2024-09-30 RX ORDER — METOPROLOL TARTRATE 25 MG/1
12.5 TABLET, FILM COATED ORAL 2 TIMES DAILY
Qty: 30 TABLET | Refills: 0 | Status: SHIPPED | OUTPATIENT
Start: 2024-09-30 | End: 2024-10-30

## 2024-09-30 RX ORDER — LIDOCAINE HYDROCHLORIDE 20 MG/ML
INJECTION, SOLUTION EPIDURAL; INFILTRATION; INTRACAUDAL; PERINEURAL
Status: DISCONTINUED | OUTPATIENT
Start: 2024-09-30 | End: 2024-09-30

## 2024-09-30 RX ORDER — PROPOFOL 10 MG/ML
VIAL (ML) INTRAVENOUS
Status: DISCONTINUED | OUTPATIENT
Start: 2024-09-30 | End: 2024-09-30

## 2024-09-30 RX ORDER — POTASSIUM CHLORIDE 20 MEQ/1
40 TABLET, EXTENDED RELEASE ORAL ONCE
Status: DISCONTINUED | OUTPATIENT
Start: 2024-09-30 | End: 2024-09-30

## 2024-09-30 RX ORDER — PANTOPRAZOLE SODIUM 40 MG/1
40 TABLET, DELAYED RELEASE ORAL DAILY
Qty: 30 TABLET | Refills: 0 | Status: SHIPPED | OUTPATIENT
Start: 2024-09-30 | End: 2024-10-30

## 2024-09-30 RX ADMIN — PROPOFOL 50 MG: 10 INJECTION, EMULSION INTRAVENOUS at 03:09

## 2024-09-30 RX ADMIN — LIDOCAINE HYDROCHLORIDE 50 MG: 20 INJECTION, SOLUTION INTRAVENOUS at 02:09

## 2024-09-30 RX ADMIN — SODIUM CHLORIDE: 900 INJECTION, SOLUTION INTRAVENOUS at 11:09

## 2024-09-30 RX ADMIN — PROPOFOL 75 MG: 10 INJECTION, EMULSION INTRAVENOUS at 02:09

## 2024-09-30 RX ADMIN — SODIUM CHLORIDE: 9 INJECTION, SOLUTION INTRAVENOUS at 02:09

## 2024-09-30 NOTE — ASSESSMENT & PLAN NOTE
Patient has acute blood loss due to hemorrhage, the hemorrhage is due to gastrointestinal bleed, patient does have a propensity for bleeding due to a medication, the medication is Eliquis. Will trend hemoglobin/hematocrit Every 8 hours, as well as monitor and correct for any coagulation defects. CBC and vital signs have been reviewed and last CBC was noted-   Lab Results   Component Value Date    WBC 4.16 (L) 09/30/2024    HGB 8.8 (L) 09/30/2024    HCT 26.7 (L) 09/30/2024    MCV 95.4 09/30/2024     09/30/2024       Hold home Eliquis.  IV Protonix 40mg BID.  Will order a type and screen and consent patient for blood transfusion. Will transfuse if Hgb is <7g/dl (<8g/dl in cases of active ACS) or if patient has rapid bleeding leading to hemodynamic instability.  GI consult, recommendations appreciated.    9/26  Per GI: Continue holding Eliquis; Continue with clear liquid diet; Consider colonoscopy once Eliquis held x 3 days   -Monitor H/H daily    9/27  Pt still endorses bloody stool.  Eliquis still held  H/H worsened but still stable  Monitor H/H daily    9/28  Pt still endorses dark red blood per rectum  H/H stable  Monitor H/H daily    9/29  Patient states bowel movement today with no blood noted.   States that yesterday noted blood but has been progressively reducing.    Gastroenterology onboard and plan for prep today and colonoscopy tomorrow.   Holding Eliquis. H/H stable.    9/30  Per GI: Colonoscopy w polypectomy done -> Overall Impression: Diverticulosis in the ascending colon, descending colon and sigmoid colon  The colon was examined to the cecum. No bleeding was seen. Pan-diverticulosis was present. A few non-bleeding avm's were present in the rectum.  Recommendations by GI:  -F/UP with PCP in 1 week  -Resume diet and iron supplementation as needed.   -Obtain CT angiogram of the abdomen and IR consult if pt has recurrent bleeding.   -No anticipation of repeat screening colonoscopy due to age of  pt.    Patient has reached the maximum benefit from this hospitalization and is stable to be discharged home.   Hold Eliquis and Aspirin due to risk of GI bleed.

## 2024-09-30 NOTE — ANESTHESIA PREPROCEDURE EVALUATION
09/30/2024  Roberth Alberto is a 83 y.o., male.      Pre-op Assessment    I have reviewed the Patient Summary Reports.     I have reviewed the Nursing Notes. I have reviewed the NPO Status.   I have reviewed the Medications.     Review of Systems  Anesthesia Hx:  No problems with previous Anesthesia                Cardiovascular:     Hypertension                                  Hypertension     Atrial Fibrillation         Physical Exam  General: Well nourished, Cooperative, Alert and Oriented    Airway:  Mallampati: II   Mouth Opening: Normal  TM Distance: Normal  Tongue: Normal  Neck ROM: Normal ROM    Dental:  Intact        Anesthesia Plan  Type of Anesthesia, risks & benefits discussed:    Anesthesia Type: Gen Natural Airway, MAC  Intra-op Monitoring Plan: Standard ASA Monitors  Post Op Pain Control Plan: multimodal analgesia  Induction:  IV  Informed Consent: Informed consent signed with the Patient and all parties understand the risks and agree with anesthesia plan.  All questions answered. Patient consented to blood products? Yes  ASA Score: 3  Day of Surgery Review of History & Physical: I have interviewed and examined the patient. I have reviewed the patient's H&P dated: There are no significant changes.     Ready For Surgery From Anesthesia Perspective.     .  Patient Active Problem List   Diagnosis    Gastrointestinal hemorrhage    Primary hypertension    Atrial fibrillation    Anxiety      Past Medical History:   Diagnosis Date    Hypertension        Past Surgical History:   Procedure Laterality Date    PROSTATE SURGERY         No family history on file.    Social History     Socioeconomic History    Marital status: Significant Other   Tobacco Use    Smoking status: Never    Smokeless tobacco: Never   Substance and Sexual Activity    Alcohol use: Never    Drug use: Never     Social Drivers of  Health     Food Insecurity: No Food Insecurity (6/19/2023)    Received from [facility]    Hunger Vital Sign     Worried About Running Out of Food in the Last Year: Never true     Ran Out of Food in the Last Year: Never true       Current Facility-Administered Medications   Medication Dose Route Frequency Provider Last Rate Last Admin    0.9%  NaCl infusion   Intravenous Continuous Efra Muniz MD 75 mL/hr at 09/30/24 1131 New Bag at 09/30/24 1131    atorvastatin tablet 20 mg  20 mg Oral QHS Michael Peñaloza Jr., MD   20 mg at 09/29/24 2130    losartan tablet 50 mg  50 mg Oral Daily Efra Muniz MD   50 mg at 09/29/24 0818    metoprolol tartrate (LOPRESSOR) split tablet 12.5 mg  12.5 mg Oral BID Efra Muniz MD   12.5 mg at 09/29/24 2130    ondansetron injection 4 mg  4 mg Intravenous Q6H PRN Michael Peñaloza Jr., MD        pantoprazole injection 40 mg  40 mg Intravenous BID Efra Muniz MD   40 mg at 09/29/24 2130    potassium bicarbonate disintegrating tablet 40 mEq  40 mEq Oral Once Efra Muniz MD        sertraline tablet 50 mg  50 mg Oral Daily Efra Muniz MD   50 mg at 09/29/24 0817       Review of patient's allergies indicates:  No Known Allergies    Latest Reference Range & Units 09/30/24 03:15   WBC 4.50 - 11.00 K/uL 4.16 (L)   RBC 4.60 - 6.20 M/uL 2.80 (L)   Hemoglobin 13.5 - 18.0 g/dL 8.8 (L)   Hematocrit 40.0 - 54.0 % 26.7 (L)   MCV 80.0 - 96.0 fL 95.4   MCH 27.0 - 31.0 pg 31.4 (H)   MCHC 32.0 - 36.0 g/dL 33.0   RDW 11.5 - 14.5 % 12.7   Platelet Count 150 - 400 K/uL 150   (L): Data is abnormally low  (H): Data is abnormally high   Latest Reference Range & Units 09/30/24 03:15   Sodium 136 - 145 mmol/L 141   Potassium 3.5 - 5.1 mmol/L 3.4 (L)   Chloride 98 - 107 mmol/L 107   CO2 21 - 32 mmol/L 29   Anion Gap 7 - 16 mmol/L 8   BUN 7 - 18 mg/dL 4 (L)   Creatinine 0.70 - 1.30 mg/dL 0.91   BUN/CREAT RATIO 6 - 20  4 (L)   eGFR >=60 mL/min/1.73m2 83   Glucose 74 - 106 mg/dL 106    Calcium 8.5 - 10.1 mg/dL 8.3 (L)   (L): Data is abnormally low

## 2024-09-30 NOTE — DISCHARGE SUMMARY
[facility]  Discharge Summary      Patient Name: Roberth Alberto  MRN: [id number]  CHER: [id number]  Patient Class: IP- Inpatient  Admission Date: 9/25/2024  Hospital Length of Stay: 5 days  Discharge Date and Time:  09/30/2024 6:29 PM  Attending Physician: Edwin Garcia MD   Discharging Provider: Christopher Henry Jr., MD  Primary Care Provider: Yessy Pruitt MD (Inactive)    Primary Care Team: Networked reference to record PCT     HPI:   Patient is a 82 y/o male with PMHx of Afib on Eliquis, HTN, anxiety, GERD s/p esophageal dilation who presents to the ED with complaint of blood per rectum. Patient reports having dark blood per rectum with bowel movements since yesterday morning. He reports having GI bleed x3 in the past that required blood transfusion, last GI bleed was last year. He had esophageal dilation one month ago in [place] where he spends time with his significant other. Patient follows with cardiology, Dr. Kam, at [facility] [place]. Patient denies smoking or heavy alcohol use. He lives alone. Patient denies fever, chills, nausea, vomiting, SOB, chest pain or urinary habit changes.    In the ED vital signs showed /78, HR 78, RR 18, SpO2 97% and afebrile. Blood work H/H 11/33, WBC 3.48, . No electrolyte abnormalities. BUN/Cr 19/1.13, glucose 110. FOBT positive. Patient will be admitted to the hospital for further management.    * No surgery found *      Hospital Course:   No notes on file     Goals of Care Treatment Preferences:  Code Status: Full Code         Consults:   Consults (From admission, onward)          Status Ordering Provider     Inpatient consult to Gastroenterology  Once        Provider:  FE Junior MD    Completed EDGARD KAUR            Psychiatric  Anxiety  Continue home sertraline.      Cardiac/Vascular  Primary hypertension  Chronic, controlled. Latest blood pressure and vitals reviewed-     Temp:  [97.7 °F (36.5 °C)-98.3  °F (36.8 °C)]   Pulse:  [63-87]   Resp:  [0-20]   BP: (105-185)/(50-79)   SpO2:  [94 %-100 %] .   Home meds for hypertension were reviewed and noted below.   Hypertension Medications               losartan (COZAAR) 50 MG tablet Take 50 mg by mouth once daily.  TAKE ONE TABLET BY MOUTH DAILY FOR BLOOD PRESSURE STOP HCTZ FOR BLOOD PRESSURE    metoprolol tartrate (LOPRESSOR) 25 MG tablet Take 12.5 mg by mouth 2 (two) times daily.  TAKE ONE-HALF TABLET BY MOUTH 2 TIMES A DAY            While in the hospital, will manage blood pressure as follows; Continue home antihypertensive regimen    Will utilize p.r.n. blood pressure medication only if patient's blood pressure greater than 180/110 and he develops symptoms such as worsening chest pain or shortness of breath.    9/30  Resume BP home meds    Atrial fibrillation  Patient with Long standing persistent (>12 months) atrial fibrillation which is controlled currently with Beta Blocker. Patient is currently in sinus rhythm.[id number] Score: 2. HASBLED Score: 2. Holding home Eliquis due to acute GI bleeding.    9/30  Stable for discharge.  Hold Eliquis and Aspirin due to risk of GI bleed.  Follow with PCP.    GI  * Gastrointestinal hemorrhage  Patient has acute blood loss due to hemorrhage, the hemorrhage is due to gastrointestinal bleed, patient does have a propensity for bleeding due to a medication, the medication is Eliquis. Will trend hemoglobin/hematocrit Every 8 hours, as well as monitor and correct for any coagulation defects. CBC and vital signs have been reviewed and last CBC was noted-   Lab Results   Component Value Date    WBC 4.16 (L) 09/30/2024    HGB 8.8 (L) 09/30/2024    HCT 26.7 (L) 09/30/2024    MCV 95.4 09/30/2024     09/30/2024       Hold home Eliquis.  IV Protonix 40mg BID.  Will order a type and screen and consent patient for blood transfusion. Will transfuse if Hgb is <7g/dl (<8g/dl in cases of active ACS) or if patient has rapid bleeding  leading to hemodynamic instability.  GI consult, recommendations appreciated.    9/26  Per GI: Continue holding Eliquis; Continue with clear liquid diet; Consider colonoscopy once Eliquis held x 3 days   -Monitor H/H daily    9/27  Pt still endorses bloody stool.  Eliquis still held  H/H worsened but still stable  Monitor H/H daily    9/28  Pt still endorses dark red blood per rectum  H/H stable  Monitor H/H daily    9/29  Patient states bowel movement today with no blood noted.   States that yesterday noted blood but has been progressively reducing.    Gastroenterology onboard and plan for prep today and colonoscopy tomorrow.   Holding Eliquis. H/H stable.    9/30  Per GI: Colonoscopy w polypectomy done -> Overall Impression: Diverticulosis in the ascending colon, descending colon and sigmoid colon  The colon was examined to the cecum. No bleeding was seen. Pan-diverticulosis was present. A few non-bleeding avm's were present in the rectum.  Recommendations by GI:  -F/UP with PCP in 1 week  -Resume diet and iron supplementation as needed.   -Obtain CT angiogram of the abdomen and IR consult if pt has recurrent bleeding.   -No anticipation of repeat screening colonoscopy due to age of pt.    Patient has reached the maximum benefit from this hospitalization and is stable to be discharged home.   Hold Eliquis and Aspirin due to risk of GI bleed.      Final Active Diagnoses:    Diagnosis Date Noted POA    PRINCIPAL PROBLEM:  Gastrointestinal hemorrhage [K92.2] 09/25/2024 Yes    Atrial fibrillation [I48.91] 09/25/2024 Yes    Acute blood loss anemia [D62] 09/30/2024 Yes    Colon, diverticulosis [K57.30] 09/30/2024 Yes    Primary hypertension [I10] 09/25/2024 Yes    Anxiety [F41.9] 09/25/2024 Yes      Problems Resolved During this Admission:       Discharged Condition: stable    Disposition: Home or Self Care    Follow Up:   Follow-up Information       Yessy Pruitt MD Follow up in 1 week(s).    Specialty: Internal  "Medicine  Why: post hospital discharge f/u  Contact information:  [address] [facility] [place] [address]  [phone]                           Patient Instructions:      Diet Cardiac     Activity as tolerated       Significant Diagnostic Studies: Labs: CMP   Recent Labs   Lab 09/29/24 0317 09/30/24  0315    141   K 3.6 3.4*    107   CO2 27 29    106   BUN 6* 4*   CREATININE 0.96 0.91   CALCIUM 8.3* 8.3*   ANIONGAP 9 8   , CBC   Recent Labs   Lab 09/29/24 0316 09/30/24  0315   WBC 3.68* 4.16*   HGB 9.1* 8.8*   HCT 27.0* 26.7*   * 150   , Lipid Panel No results found for: "CHOL", "HDL", "LDLCALC", "TRIG", "CHOLHDL", and A1C:   Recent Labs   Lab 09/26/24  0423   HGBA1C 5.8     Microbiology: Blood Culture No results found for: "LABBLOO", Sputum Culture No results found for: "GSRESP", "RESPIRATORYC", and Urine Culture  No results found for: "LABURIN"  Radiology: X-Ray: CXR: X-Ray Chest 1 View (CXR): No results found for this visit on 09/25/24. and X-Ray Chest PA and Lateral (CXR): No results found for this visit on 09/25/24. and KUB: X-Ray Abdomen AP 1 View (KUB): No results found for this visit on 09/25/24.  MRI:   CT scan: CT ABDOMEN PELVIS WITH CONTRAST: No results found for this visit on 09/25/24. and CT ABDOMEN PELVIS WITHOUT CONTRAST: No results found for this visit on 09/25/24.  Ultrasound:   Nuclear Medicine:   Endoscopy: Colonoscopy: and Gastroscopy:     Pending Diagnostic Studies:       None           Medications:  Reconciled Home Medications:      Medication List        START taking these medications      pantoprazole 40 MG tablet  Commonly known as: PROTONIX  Take 1 tablet (40 mg total) by mouth once daily.            CHANGE how you take these medications      metoprolol tartrate 25 MG tablet  Commonly known as: LOPRESSOR  Take 0.5 tablets (12.5 mg total) by mouth 2 (two) times daily.  What changed: additional instructions            CONTINUE taking these medications      azelastine 137 " mcg (0.1 %) nasal spray  Commonly known as: ASTELIN  1 spray by Nasal route 2 (two) times daily.     cetirizine 10 MG tablet  Commonly known as: ZYRTEC  Take 10 mg by mouth once daily.     ferrous sulfate Tab tablet  Commonly known as: FEOSOL  Take 1 tablet by mouth daily with breakfast.     fish oil-omega-3 fatty acids 300-1,000 mg capsule  Take 1 capsule by mouth once daily.     folic acid 1 MG tablet  Commonly known as: FOLVITE  Take 1 mg by mouth once daily.     hydroCHLOROthiazide 25 MG tablet  Commonly known as: HYDRODIURIL  Take 25 mg by mouth once daily.     losartan 50 MG tablet  Commonly known as: COZAAR  Take 50 mg by mouth once daily.  TAKE ONE TABLET BY MOUTH DAILY FOR BLOOD PRESSURE STOP HCTZ FOR BLOOD PRESSURE     montelukast 10 mg tablet  Commonly known as: SINGULAIR  Take 10 mg by mouth once daily.     multivitamin per tablet  Commonly known as: THERAGRAN  Take 1 tablet by mouth once daily.     sertraline 50 MG tablet  Commonly known as: ZOLOFT  Take 100 mg by mouth once daily.     simvastatin 40 MG tablet  Commonly known as: ZOCOR  Take 10 mg by mouth every evening.  TAKE ONE-HALF TABLET BY MOUTH EVERY EVENING FOR CHOLESTEROL     VITAMIN D3 ORAL  Take by mouth.            STOP taking these medications      apixaban 5 mg Tab  Commonly known as: ELIQUIS     aspirin 81 MG Chew     metoprolol succinate 25 MG 24 hr tablet  Commonly known as: TOPROL-XL              Indwelling Lines/Drains at time of discharge:   Lines/Drains/Airways       None                   Time spent on the discharge of patient: 45 minutes         Christopher Henry Jr., MD  Department of Hospital Medicine  [facility]

## 2024-09-30 NOTE — ANESTHESIA POSTPROCEDURE EVALUATION
Anesthesia Post Evaluation    Patient: Roberth Alberto    Procedure(s) Performed: * No procedures listed *    Final Anesthesia Type: MAC      Patient location during evaluation: GI PACU ([facility])  Patient participation: Yes- Able to Participate  Level of consciousness: awake and alert  Post-procedure vital signs: reviewed and stable  Pain management: adequate  Airway patency: patent    PONV status at discharge: No PONV  Anesthetic complications: no      Cardiovascular status: blood pressure returned to baseline, hemodynamically stable and stable  Respiratory status: unassisted  Hydration status: euvolemic  Follow-up not needed.  Comments: Refer to nursing note for pain/sweetie score upon discharge from recovery.               Vitals Value Taken Time   /73 09/30/24 1605   Temp 36.6 °C (97.8 °F) 09/30/24 1530   Pulse 63 09/30/24 1605   Resp 16 09/30/24 1605   SpO2 98 % 09/30/24 1605   Vitals shown include unfiled device data.      Event Time   Out of Recovery 09/30/2024 16:16:36         Pain/Sweetie Score: Sweetie Score: 10 (9/30/2024  3:43 PM)

## 2024-09-30 NOTE — PLAN OF CARE
Problem: Gastrointestinal Bleeding  Goal: Optimal Coping with Acute Illness  Outcome: Progressing  Goal: Hemostasis  Outcome: Progressing     Problem: Adult Inpatient Plan of Care  Goal: Plan of Care Review  Outcome: Progressing  Goal: Patient-Specific Goal (Individualized)  Outcome: Progressing  Goal: Absence of Hospital-Acquired Illness or Injury  Outcome: Progressing  Goal: Optimal Comfort and Wellbeing  Outcome: Progressing  Goal: Readiness for Transition of Care  Outcome: Progressing     Problem: Fall Injury Risk  Goal: Absence of Fall and Fall-Related Injury  Outcome: Progressing      oral

## 2024-09-30 NOTE — ASSESSMENT & PLAN NOTE
Patient with Long standing persistent (>12 months) atrial fibrillation which is controlled currently with Beta Blocker. Patient is currently in sinus rhythm.[id number] Score: 2. HASBLED Score: 2. Holding home Eliquis due to acute GI bleeding.    9/30  Stable for discharge.  Hold Eliquis and Aspirin due to risk of GI bleed.  Follow with PCP.

## 2024-09-30 NOTE — ASSESSMENT & PLAN NOTE
Chronic, controlled. Latest blood pressure and vitals reviewed-     Temp:  [97.7 °F (36.5 °C)-98.3 °F (36.8 °C)]   Pulse:  [63-87]   Resp:  [0-20]   BP: (105-185)/(50-79)   SpO2:  [94 %-100 %] .   Home meds for hypertension were reviewed and noted below.   Hypertension Medications               losartan (COZAAR) 50 MG tablet Take 50 mg by mouth once daily.  TAKE ONE TABLET BY MOUTH DAILY FOR BLOOD PRESSURE STOP HCTZ FOR BLOOD PRESSURE    metoprolol tartrate (LOPRESSOR) 25 MG tablet Take 12.5 mg by mouth 2 (two) times daily.  TAKE ONE-HALF TABLET BY MOUTH 2 TIMES A DAY            While in the hospital, will manage blood pressure as follows; Continue home antihypertensive regimen    Will utilize p.r.n. blood pressure medication only if patient's blood pressure greater than 180/110 and he develops symptoms such as worsening chest pain or shortness of breath.    9/30  Resume BP home meds

## 2024-09-30 NOTE — DISCHARGE INSTRUCTIONS
Procedure Date  9/30/24     Impression  Overall Impression:   Diverticulosis in the ascending colon, descending colon and sigmoid colon  The colon was examined to the cecum. No bleeding was seen. Pan-diverticulosis was present. A few non-bleeding avm's were present in the rectum.  Imp: s/p GI bleed, pan-diverticulosis of the colon. Rectal avm's without bleeding.     Recommendation  Follow up with PCP      Disp: DC to hospital room. Resume diet and iron supplementation as needed. Obtain CT angiogram of the abdomen and IR consult if pt has recurrent bleeding. I don't anticipate repeat screening colonoscopy due to age of pt.

## 2024-09-30 NOTE — H&P
[facility]  Gastroenterology  H&P    Patient Name: Roberth Alberto  MRN: [id number]  Admission Date: 9/25/2024  Code Status: Full Code    Attending Provider: Edwin Garcia MD   Primary Care Physician: Yessy Pruitt MD (Inactive)  Principal Problem:Gastrointestinal hemorrhage    Subjective:     History of Present Illness: Pt has gi bleeding and anemia with red blood per rectum. He takes DAA for afib.    Past Medical History:   Diagnosis Date    Hypertension        Past Surgical History:   Procedure Laterality Date    PROSTATE SURGERY         Review of patient's allergies indicates:  No Known Allergies  Family History    None       Tobacco Use    Smoking status: Never    Smokeless tobacco: Never   Substance and Sexual Activity    Alcohol use: Never    Drug use: Never    Sexual activity: Not on file     Review of Systems   Respiratory: Negative.     Cardiovascular: Negative.    Gastrointestinal:  Positive for blood in stool.     Objective:     Vital Signs (Most Recent):  Temp: 97.8 °F (36.6 °C) (09/30/24 1110)  Pulse: 67 (09/30/24 1110)  Resp: 18 (09/30/24 1110)  BP: (!) 174/69 (09/30/24 1110)  SpO2: 99 % (09/30/24 1110) Vital Signs (24h Range):  Temp:  [97.7 °F (36.5 °C)-98.3 °F (36.8 °C)] 97.8 °F (36.6 °C)  Pulse:  [63-87] 67  Resp:  [16-18] 18  SpO2:  [94 %-99 %] 99 %  BP: (145-185)/(69-79) 174/69     Weight: 84.8 kg (186 lb 15.2 oz) (09/27/24 0035)  Body mass index is 28.43 kg/m².    No intake or output data in the 24 hours ending 09/30/24 1512    Lines/Drains/Airways       Peripheral Intravenous Line  Duration                  Peripheral IV - Single Lumen 09/25/24 1016 20 G Anterior;Distal;Right Upper Arm 5 days                    Physical Exam  Vitals reviewed.   Constitutional:       General: He is not in acute distress.     Appearance: Normal appearance. He is well-developed. He is not ill-appearing.   HENT:      Head: Normocephalic and atraumatic.      Nose: Nose normal.   Eyes:       Pupils: Pupils are equal, round, and reactive to light.   Cardiovascular:      Rate and Rhythm: Normal rate and regular rhythm.   Pulmonary:      Effort: Pulmonary effort is normal.      Breath sounds: Normal breath sounds. No wheezing.   Abdominal:      General: Abdomen is flat. Bowel sounds are normal. There is no distension.      Palpations: Abdomen is soft.      Tenderness: There is no abdominal tenderness. There is no guarding.   Skin:     General: Skin is warm and dry.      Coloration: Skin is not jaundiced.   Neurological:      Mental Status: He is alert.   Psychiatric:         Attention and Perception: Attention normal.         Mood and Affect: Affect normal.         Speech: Speech normal.         Behavior: Behavior is cooperative.      Comments: Pt was calm while speaking.         Significant Labs:  CBC:   Recent Labs   Lab 09/29/24 0316 09/30/24 0315   WBC 3.68* 4.16*   HGB 9.1* 8.8*   HCT 27.0* 26.7*   * 150     CMP:   Recent Labs   Lab 09/30/24 0315      CALCIUM 8.3*      K 3.4*   CO2 29      BUN 4*   CREATININE 0.91       Significant Imaging:  Imaging results within the past 24 hours have been reviewed.    Assessment/Plan:     Active Diagnoses:    Diagnosis Date Noted POA    PRINCIPAL PROBLEM:  Gastrointestinal hemorrhage [K92.2] 09/25/2024 Yes    Primary hypertension [I10] 09/25/2024 Yes    Atrial fibrillation [I48.91] 09/25/2024 Yes    Anxiety [F41.9] 09/25/2024 Yes      Problems Resolved During this Admission:         Imp: s/p gi bleed, blood loss anemia  Plan: colonoscopy    Andres Dumont MD  Gastroenterology  [facility]

## 2024-09-30 NOTE — TRANSFER OF CARE
"Anesthesia Transfer of Care Note    Patient: Roberth Alberto    Procedure(s) Performed: * No procedures listed *    Patient location: GI    Anesthesia Type: general    Transport from OR: Transported from OR on room air with adequate spontaneous ventilation    Post pain: adequate analgesia    Post assessment: no apparent anesthetic complications    Post vital signs: stable    Level of consciousness: responds to stimulation    Nausea/Vomiting: no nausea/vomiting    Complications: none    Transfer of care protocol was followed      Last vitals: Visit Vitals  BP (!) 105/50 (BP Location: Right arm, Patient Position: Lying)   Pulse 75   Temp 36.6 °C (97.8 °F) (Oral)   Resp 17   Ht 5' 8" (1.727 m)   Wt 84.8 kg (186 lb 15.2 oz)   SpO2 98%   BMI 28.43 kg/m²     "

## 2024-10-01 ENCOUNTER — PATIENT OUTREACH (OUTPATIENT)
Dept: ADMINISTRATIVE | Facility: CLINIC | Age: 84
End: 2024-10-01

## 2024-10-01 LAB
OHS QRS DURATION: 82 MS
OHS QTC CALCULATION: 433 MS

## 2024-10-01 NOTE — PLAN OF CARE
Problem: Gastrointestinal Bleeding  Goal: Optimal Coping with Acute Illness  9/30/2024 1928 by Marta Holder RN  Outcome: Met  9/30/2024 1802 by Marta Holder RN  Outcome: Progressing  Goal: Hemostasis  9/30/2024 1928 by Marta Holder RN  Outcome: Met  9/30/2024 1802 by Marta Holder RN  Outcome: Progressing     Problem: Adult Inpatient Plan of Care  Goal: Plan of Care Review  9/30/2024 1928 by Marta Holder RN  Outcome: Met  9/30/2024 1802 by Marta Holder RN  Outcome: Progressing  Goal: Patient-Specific Goal (Individualized)  9/30/2024 1928 by Marta Holder RN  Outcome: Met  9/30/2024 1802 by Marta Holder RN  Outcome: Progressing  Goal: Absence of Hospital-Acquired Illness or Injury  9/30/2024 1928 by Marta Holder RN  Outcome: Met  9/30/2024 1802 by Marta Holder RN  Outcome: Progressing  Goal: Optimal Comfort and Wellbeing  9/30/2024 1928 by Marta Holder RN  Outcome: Met  9/30/2024 1802 by Marta Holder RN  Outcome: Progressing  Goal: Readiness for Transition of Care  9/30/2024 1928 by Marta Holder RN  Outcome: Met  9/30/2024 1802 by Marta Holder RN  Outcome: Progressing     Problem: Fall Injury Risk  Goal: Absence of Fall and Fall-Related Injury  9/30/2024 1928 by Marta Holder RN  Outcome: Met  9/30/2024 1802 by Marta Holder RN  Outcome: Progressing

## 2024-10-01 NOTE — PROGRESS NOTES
C3 nurse spoke with Roberth Alberto  for a TCC post hospital discharge follow up call. The patient has a scheduled HOSFU appointment with Yessy Pruitt MD (Inactive)  on 10/2/24 @ 1000.

## 2024-10-01 NOTE — PLAN OF CARE
[facility]  Discharge Final Note    Primary Care Provider: Yessy Pruitt MD (Inactive)    Expected Discharge Date: 9/30/2024    Final Discharge Note (most recent)       Final Note - 10/01/24 0924          Final Note    Assessment Type Final Discharge Note     Anticipated Discharge Disposition Home or Self Care                     Important Message from Medicare  Important Message from Medicare regarding Discharge Appeal Rights: Given to patient/caregiver, Explained to patient/caregiver, Signed/date by patient/caregiver     Date IMM was signed: 09/30/24  Time IMM was signed: 1050    Contact Info       Yessy Pruitt MD   Specialty: Internal Medicine   Relationship: PCP - General    [address] [facility] [place] [id number]   Phone: [phone]       Next Steps: Follow up in 1 week(s)    Instructions: post hospital discharge f/u          Pt discharged home with no needs.

## 2024-10-01 NOTE — NURSING
Reached out to GI lab concerning delay on going for scope. Stated they had a heavy case load today and it would possibly be 2 hours before they were able to get pt.